# Patient Record
Sex: FEMALE | Race: BLACK OR AFRICAN AMERICAN | Employment: STUDENT | URBAN - METROPOLITAN AREA
[De-identification: names, ages, dates, MRNs, and addresses within clinical notes are randomized per-mention and may not be internally consistent; named-entity substitution may affect disease eponyms.]

---

## 2017-01-16 ENCOUNTER — OFFICE VISIT (OUTPATIENT)
Dept: FAMILY MEDICINE CLINIC | Age: 27
End: 2017-01-16

## 2017-01-16 VITALS
WEIGHT: 103.4 LBS | OXYGEN SATURATION: 98 % | HEART RATE: 74 BPM | RESPIRATION RATE: 16 BRPM | BODY MASS INDEX: 17.65 KG/M2 | HEIGHT: 64 IN | TEMPERATURE: 98.2 F | SYSTOLIC BLOOD PRESSURE: 113 MMHG | DIASTOLIC BLOOD PRESSURE: 75 MMHG

## 2017-01-16 DIAGNOSIS — Z11.3 SCREENING FOR STD (SEXUALLY TRANSMITTED DISEASE): ICD-10-CM

## 2017-01-16 DIAGNOSIS — N60.01 BREAST CYST, RIGHT: Primary | ICD-10-CM

## 2017-01-16 DIAGNOSIS — Z77.21 EXPOSURE TO BLOOD OR BODY FLUID: ICD-10-CM

## 2017-01-16 NOTE — PROGRESS NOTES
1. Have you been to the ER, urgent care clinic since your last visit? Hospitalized since your last visit? No    2. Have you seen or consulted any other health care providers outside of the [address] [place] [address] since your last visit? Include any pap smears or colon screening.  No     Chief Complaint   Patient presents with    Follow-up     1 month     Breast Cyst

## 2017-01-16 NOTE — PATIENT INSTRUCTIONS
Fibrocystic Breast Changes: Care Instructions  Your Care Instructions  Fibrocystic breast changes cause many small lumps to form in your breast. Some areas of your breast may feel thicker or denser than other areas. Your breasts also may feel sore or tender. You may notice lumps in both breasts around the nipple and in the upper, outer part of the breasts, especially before your menstrual period. The lumps may come and go and change size in just a few days. Fibrocystic breast changes are normal and are not cancer. Treatment is not usually needed. If you have a hard, grainy lump, unusual pain, or nipple discharge, your doctor may order tests to look for a more serious problem. Talk to your doctor about the need for regular mammograms. Follow-up care is a key part of your treatment and safety. Be sure to make and go to all appointments, and call your doctor if you are having problems. Its also a good idea to know your test results and keep a list of the medicines you take. How can you care for yourself at home? · Take an over-the-counter pain medicine, such as acetaminophen (Tylenol), ibuprofen (Advil, Motrin), or naproxen (Aleve). Read and follow all instructions on the label. · Do not take two or more pain medicines at the same time unless the doctor told you to. Many pain medicines have acetaminophen, which is Tylenol. Too much acetaminophen (Tylenol) can be harmful. · Wear a supportive bra, such as a sports bra or jog bra. · You may want to limit caffeine. Some women say that cutting back on caffeine reduces breast tenderness. · A diet very low in fat (about 15% of daily diet) may reduce breast tenderness. Talk to your doctor about whether you should try a very low-fat diet. When should you call for help? Watch closely for changes in your health, and be sure to contact your doctor if:  · You have a fever. · You have swelling, redness, or pain.   · You have discharge from your nipple that looks like pus or blood. · You have a new, hard lump in your breast.  Where can you learn more? Go to http://patrice-florencio.info/. Enter D644 in the search box to learn more about \"Fibrocystic Breast Changes: Care Instructions. \"  Current as of: February 25, 2016  Content Version: 11.1  © 4070-1788 [employer]. Care instructions adapted under license by [employer] (which disclaims liability or warranty for this information). If you have questions about a medical condition or this instruction, always ask your healthcare professional. [place] [address] any warranty or liability for your use of this information. Breast Lumps (Noncancerous): Care Instructions  Your Care Instructions  Breast lumps or changes are a common health worry for most women. Women may have many kinds of breast lumps and other breast changes throughout their lives, including ones that occur with menstrual periods, pregnancy, and aging. Most breast lumps are harmless and are not cancer. Many womens breasts feel lumpy and tender before their menstrual periods. Women also may have lumps when they are breastfeeding. Breast lumps may go away after menopause. Common noncancerous breast lumps include:  · Cysts, or sacs filled with fluid. · Skin cysts in the breast area. · Fatty lumps, which may be firm. These may or may not cause pain. · Fibroadenomas, growths that are round and firm with smooth edges. · Abscesses, which are pockets of infection within the breast.  Follow-up care is a key part of your treatment and safety. Be sure to make and go to all appointments, and call your doctor if you are having problems. It's also a good idea to know your test results and keep a list of the medicines you take. How can you care for yourself at home? · Take an over-the-counter pain medicine, such as acetaminophen (Tylenol), ibuprofen (Advil, Motrin), or naproxen (Aleve).  Read and follow all instructions on the label. · Do not take two or more pain medicines at the same time unless the doctor told you to. Many pain medicines have acetaminophen, which is Tylenol. Too much acetaminophen (Tylenol) can be harmful. · If you are pregnant, do not take any medicine other than acetaminophen unless your doctor has told you to. · Wear a supportive bra, such as a sports bra or jog bra. · You may want to limit caffeine. Some women say that cutting back on caffeine reduces their breast tenderness. · A diet very low in fat (about 15% of daily diet) may reduce breast tenderness. Talk to your doctor about whether you should try a very low-fat diet. · A diet low in salt (sodium) also may reduce breast tenderness. When should you call for help? Watch closely for changes in your health, and be sure to contact your doctor if you:  · Have a fever. · Have swelling, redness, or pain. · Have a new breast lump that does not go away with your menstrual cycle. · Notice that a breast lump has changed. Where can you learn more? Go to http://patrice-florencio.info/. Enter [id number] in the search box to learn more about \"Breast Lumps (Noncancerous): Care Instructions. \"  Current as of: February 25, 2016  Content Version: 11.1  © 3788-2513 [employer]. Care instructions adapted under license by [employer] (which disclaims liability or warranty for this information). If you have questions about a medical condition or this instruction, always ask your healthcare professional. [place] [address] any warranty or liability for your use of this information. Exposure to Sexually Transmitted Infections: Care Instructions  Your Care Instructions  Sexually transmitted infections (STIs) are those diseases spread by sexual contact. There are at least 20 different STIs, including chlamydia, gonorrhea, syphilis, and human immunodeficiency virus (HIV), which causes AIDS. Bacteria-caused STIs can be treated and cured. STIs caused by viruses, such as HIV, can be treated but not cured. Some STIs can reduce a woman's chances of getting pregnant in the future. STIs are spread during sexual contact, such as vaginal intercourse and oral or anal sex. Follow-up care is a key part of your treatment and safety. Be sure to make and go to all appointments, and call your doctor if you are having problems. Its also a good idea to know your test results and keep a list of the medicines you take. How can you care for yourself at home? · Your doctor may have given you a shot of antibiotics. If your doctor prescribed antibiotic pills, take them as directed. Do not stop taking them just because you feel better. You need to take the full course of antibiotics. · Do not have sexual contact while you have symptoms of an STI or are being treated for an STI. · Tell your sex partner (or partners) that he or she will need treatment. · If you are a woman, do not douche. Douching changes the normal balance of bacteria in the vagina and may spread an infection up into your reproductive organs. To prevent exposure to STIs in the future  · Use latex condoms every time you have sex. Use them from the beginning to the end of sexual contact. · Talk to your partner before you have sex. Find out if he or she has or is at risk for any STI. Keep in mind that a person may be able to spread an STI even if he or she does not have symptoms. · Do not have sex if you are being treated for an STI. · Do not have sex with anyone who has symptoms of an STI, such as sores on the genitals or mouth. · Having one sex partner (who does not have STIs and does not have sex with anyone else) is a good way to avoid STIs. When should you call for help? Call your doctor now or seek immediate medical care if:  · You have new pain in your belly or pelvis. · You have symptoms of a urinary tract infection.  These may include:  ¨ Pain or burning when you urinate. ¨ A frequent need to urinate without being able to pass much urine. ¨ Pain in the flank, which is just below the rib cage and above the waist on either side of the back. ¨ Blood in your urine. ¨ A fever. · You have new or worsening pain or swelling in the scrotum. Watch closely for changes in your health, and be sure to contact your doctor if:  · You have unusual vaginal bleeding. · You have a discharge from the vagina or penis. · You have any new symptoms, such as sores, bumps, rashes, blisters, or warts. · You have itching, tingling, pain, or burning in the genital or anal area. · You think you may have an STI. Where can you learn more? Go to http://patrice-florencio.info/. Enter O307 in the search box to learn more about \"Exposure to Sexually Transmitted Infections: Care Instructions. \"  Current as of: May 27, 2016  Content Version: 11.1  © 1763-5364 [employer]. Care instructions adapted under license by [employer] (which disclaims liability or warranty for this information). If you have questions about a medical condition or this instruction, always ask your healthcare professional. [place] [address] any warranty or liability for your use of this information. Gonorrhea and Chlamydia: About These Tests  What is it? You can have a test to find out if you have gonorrhea or chlamydia. This kind of test checks for the bacteria that cause these sexually transmitted infections (STIs). There are different ways to test for the bacteria. Most tests use either a urine sample or a sample of body fluid. A fluid sample can come from inside the tip of the penis or from inside the rectum or vagina. Why is this test done? These tests may be done to:  · Find out if your symptoms are caused by gonorrhea or chlamydia. · Find out if you have one of these infections even though you don't have symptoms.   How can you prepare for the test?  · If you are a woman, do not douche or use vaginal creams or medicines for at least 24 hours before the test.  · If you are going to have a urine test, do not urinate for 2 hours before the test.  What happens during the test?  · To get a sample from the urethra or rectum, the doctor will put a small swab into the tip of the penis or inside the rectum. · To get a sample from the vagina, the doctor will first put a speculum into the vagina. A speculum is a tool to spread apart the walls of the vagina. Then he or she will use a small swab to get the fluid sample. · For a urine sample, you will collect the urine that comes out when you first start to urinate. Don't wipe the genital area clean before you urinate. What else should you know about the test?  · If you think you may have chlamydia or gonorrhea, don't have sexual intercourse until you get your test results. And you may want to have tests for other STIs, such as HIV. · If you do have an infection, don't have sexual intercourse for 7 days after you start treatment. · If you have an infection, your sex partner(s) should also be treated. How long does the test take? · The test will take a few minutes. What happens after the test?  · You will be able to go home right away. · You can go back to your usual activities right away. Follow-up care is a key part of your treatment and safety. Be sure to make and go to all appointments, and call your doctor if you are having problems. It's also a good idea to keep a list of the medicines you take. Ask your doctor when you can expect to have your test results. Where can you learn more? Go to http://patrice-florencio.info/. Enter D751 in the search box to learn more about \"Gonorrhea and Chlamydia: About These Tests. \"  Current as of: May 27, 2016  Content Version: 11.1  © 1950-7375 [employer].  Care instructions adapted under license by [employer] (which disclaims liability or warranty for this information). If you have questions about a medical condition or this instruction, always ask your healthcare professional. [address] any warranty or liability for your use of this information.

## 2017-01-16 NOTE — PROGRESS NOTES
Chief Complaint   Patient presents with    Follow-up     1 month     Breast Cyst     she is a 32y.o. year old female who presents for evalution. The cyst went down. She does not drink caffeine but eats a lot of cookies. She has decided to cut down on that too. Reviewed PmHx, RxHx, FmHx, SocHx, AllgHx and updated and dated in the chart. Patient Active Problem List    Diagnosis    Chronic migraine without aura, with intractable migraine, so stated, without mention of status migrainosus       Nurse notes were reviewed and copied and are correct  Review of Systems - negative except as listed above in the HPI    Objective:     Vitals:    01/16/17 1037   BP: 113/75   Pulse: 74   Resp: 16   Temp: 98.2 °F (36.8 °C)   TempSrc: Oral   SpO2: 98%   Weight: 103 lb 6.4 oz (46.9 kg)   Height: 5' 4\" (1.626 m)       Physical Examination: General appearance - alert, well appearing, and in no distress  Mental status - alert, oriented to person, place, and time      Assessment/ Plan:   Billie was seen today for follow-up and breast cyst.    Diagnoses and all orders for this visit:    Breast cyst, right     the sugar/carbs might be associated with the cysts. With the sig fam history of breast CA I will recheck the mammo in aother year  Follow-up Disposition: Not on File    ICD-10-CM ICD-9-CM    1. Breast cyst, right N60.01 610.0        I have discussed the diagnosis with the patient and the intended plan as seen in the above orders. The patient has received an after-visit summary and questions were answered concerning future plans. Medication Side Effects and Warnings were discussed with patient: yes  Patient Labs were reviewed and or requested: yes    Patient Past Records were reviewed and or requested: yes        There are no Patient Instructions on file for this visit.     The patient verbalizes understanding and agrees with the plan of care        Patient has the advanced directives booklet to review

## 2017-01-16 NOTE — MR AVS SNAPSHOT
Visit Information Date & Time Provider Department Dept. Phone Encounter #  
 1/16/2017 10:00 AM Chikis White MD [facility] [id number] Follow-up Instructions Return in about 1 year (around 1/16/2018). Upcoming Health Maintenance Date Due  
 HPV AGE 9Y-34Y (1 of 3 - Female 3 Dose Series) 3/1/2001 DTaP/Tdap/Td series (1 - Tdap) 3/1/2011 PAP AKA CERVICAL CYTOLOGY 3/1/2011 Allergies as of 1/16/2017  Review Complete On: 1/16/2017 By: Chikis White MD  
  
 Severity Noted Reaction Type Reactions Cinnamon High 10/25/2016    Anaphylaxis Dilaudid [Hydromorphone] High 12/22/2015    Hives, Swelling Zofran (As Hydrochloride) [Ondansetron Hcl] High 12/22/2015    Hives, Swelling Sulfa (Sulfonamide Antibiotics) Medium 12/22/2015    Hives, Nausea and Vomiting Dilaudid [Hydromorphone (Bulk)]  02/06/2014    Hives Imitrex [Sumatriptan Succinate]  04/18/2014    Nausea Only Mushroom  10/25/2016    Vertigo Red Dye  01/16/2017    Itching, Swelling In eyes Sulfa (Sulfonamide Antibiotics)  02/06/2014    Hives Zofran [Ondansetron Hcl (Pf)]  02/06/2014    Hives Current Immunizations  Reviewed on 10/3/2016 Name Date Influenza Vaccine (Quad) PF 10/3/2016 Not reviewed this visit You Were Diagnosed With   
  
 Codes Comments Breast cyst, right    -  Primary ICD-10-CM: N60.01 
ICD-9-CM: 610.0 Exposure to blood or body fluid     ICD-10-CM: Z77.21 
ICD-9-CM: V15.85 Screening for STD (sexually transmitted disease)     ICD-10-CM: Z11.3 ICD-9-CM: V74.5 Vitals BP Pulse Temp Resp Height(growth percentile) Weight(growth percentile) 113/75 74 98.2 °F (36.8 °C) (Oral) 16 5' 4\" (1.626 m) 103 lb 6.4 oz (46.9 kg) LMP SpO2 BMI OB Status Smoking Status 12/27/2016 98% 17.75 kg/m2 Having regular periods Never Smoker Vitals History BMI and BSA Data Body Mass Index Body Surface Area 17.75 kg/m 2 1.46 m 2 Preferred Pharmacy Pharm[facility],  [place] [address] [place] [address] [phone] Your Updated Medication List  
  
   
This list is accurate as of: 1/16/17 11:10 AM.  Always use your most recent med list.  
  
  
  
  
 butalbital-acetaminophen-caffeine -40 mg per tablet Commonly known as:  FIORICET, ESGIC  
1-2 tabs at onset of migraine. Can repeat 1 tab in 4 hours if headache persists. Limit: 3 tabs/ day, max 3 days a week EPINEPHrine 0.3 mg/0.3 mL injection Commonly known as:  EPIPEN  
0.3 mL by IntraMUSCular route once as needed for up to 1 dose. rizatriptan 10 mg disintegrating tablet Commonly known as:  MAXALT-MLT 1 tab at onset of migraine; repeat 1 tab in 2 hours if HA remains; Limit: 2 tabs in 24 hours, max 3 days/ week. 30 Day Rx  
  
 vitamin E 400 unit capsule Commonly known as:  [address] Take  by mouth daily. We Performed the Following CT/NG/T.VAGINALIS AMPLIFICATION [id number] CPT(R)] HIV 1/2 AG/AB, 4TH GENERATION,W RFLX CONFIRM [[id number] Custom] Follow-up Instructions Return in about 1 year (around 1/16/2018). Patient Instructions Fibrocystic Breast Changes: Care Instructions Your Care Instructions Fibrocystic breast changes cause many small lumps to form in your breast. Some areas of your breast may feel thicker or denser than other areas. Your breasts also may feel sore or tender. You may notice lumps in both breasts around the nipple and in the upper, outer part of the breasts, especially before your menstrual period. The lumps may come and go and change size in just a few days. Fibrocystic breast changes are normal and are not cancer. Treatment is not usually needed.  If you have a hard, grainy lump, unusual pain, or nipple discharge, your doctor may order tests to look for a more serious problem. Talk to your doctor about the need for regular mammograms. Follow-up care is a key part of your treatment and safety. Be sure to make and go to all appointments, and call your doctor if you are having problems. Its also a good idea to know your test results and keep a list of the medicines you take. How can you care for yourself at home? · Take an over-the-counter pain medicine, such as acetaminophen (Tylenol), ibuprofen (Advil, Motrin), or naproxen (Aleve). Read and follow all instructions on the label. · Do not take two or more pain medicines at the same time unless the doctor told you to. Many pain medicines have acetaminophen, which is Tylenol. Too much acetaminophen (Tylenol) can be harmful. · Wear a supportive bra, such as a sports bra or jog bra. · You may want to limit caffeine. Some women say that cutting back on caffeine reduces breast tenderness. · A diet very low in fat (about 15% of daily diet) may reduce breast tenderness. Talk to your doctor about whether you should try a very low-fat diet. When should you call for help? Watch closely for changes in your health, and be sure to contact your doctor if: 
· You have a fever. · You have swelling, redness, or pain. · You have discharge from your nipple that looks like pus or blood. · You have a new, hard lump in your breast. 
Where can you learn more? Go to http://patrice-florencio.info/. Enter W207 in the search box to learn more about \"Fibrocystic Breast Changes: Care Instructions. \" Current as of: February 25, 2016 Content Version: 11.1 © 9252-8517 [employer]. Care instructions adapted under license by Voicendo (which disclaims liability or warranty for this information).  If you have questions about a medical condition or this instruction, always ask your healthcare professional. [employer] disclaims any warranty or liability for your use of this information. Breast Lumps (Noncancerous): Care Instructions Your Care Instructions Breast lumps or changes are a common health worry for most women. Women may have many kinds of breast lumps and other breast changes throughout their lives, including ones that occur with menstrual periods, pregnancy, and aging. Most breast lumps are harmless and are not cancer. Many womens breasts feel lumpy and tender before their menstrual periods. Women also may have lumps when they are breastfeeding. Breast lumps may go away after menopause. Common noncancerous breast lumps include: · Cysts, or sacs filled with fluid. · Skin cysts in the breast area. · Fatty lumps, which may be firm. These may or may not cause pain. · Fibroadenomas, growths that are round and firm with smooth edges. · Abscesses, which are pockets of infection within the breast. 
Follow-up care is a key part of your treatment and safety. Be sure to make and go to all appointments, and call your doctor if you are having problems. It's also a good idea to know your test results and keep a list of the medicines you take. How can you care for yourself at home? · Take an over-the-counter pain medicine, such as acetaminophen (Tylenol), ibuprofen (Advil, Motrin), or naproxen (Aleve). Read and follow all instructions on the label. · Do not take two or more pain medicines at the same time unless the doctor told you to. Many pain medicines have acetaminophen, which is Tylenol. Too much acetaminophen (Tylenol) can be harmful. · If you are pregnant, do not take any medicine other than acetaminophen unless your doctor has told you to. · Wear a supportive bra, such as a sports bra or jog bra. · You may want to limit caffeine. Some women say that cutting back on caffeine reduces their breast tenderness.  
· A diet very low in fat (about 15% of daily diet) may reduce breast tenderness. Talk to your doctor about whether you should try a very low-fat diet. · A diet low in salt (sodium) also may reduce breast tenderness. When should you call for help? Watch closely for changes in your health, and be sure to contact your doctor if you: 
· Have a fever. · Have swelling, redness, or pain. · Have a new breast lump that does not go away with your menstrual cycle. · Notice that a breast lump has changed. Where can you learn more? Go to http://patrice-florencio.info/. Enter 95 199969 in the search box to learn more about \"Breast Lumps (Noncancerous): Care Instructions. \" Current as of: February 25, 2016 Content Version: 11.1 © 9430-6627 [employer]. Care instructions adapted under license by [employer] (which disclaims liability or warranty for this information). If you have questions about a medical condition or this instruction, always ask your healthcare professional. [place] [address] any warranty or liability for your use of this information. Exposure to Sexually Transmitted Infections: Care Instructions Your Care Instructions Sexually transmitted infections (STIs) are those diseases spread by sexual contact. There are at least 20 different STIs, including chlamydia, gonorrhea, syphilis, and human immunodeficiency virus (HIV), which causes AIDS. Bacteria-caused STIs can be treated and cured. STIs caused by viruses, such as HIV, can be treated but not cured. Some STIs can reduce a woman's chances of getting pregnant in the future. STIs are spread during sexual contact, such as vaginal intercourse and oral or anal sex. Follow-up care is a key part of your treatment and safety. Be sure to make and go to all appointments, and call your doctor if you are having problems. Its also a good idea to know your test results and keep a list of the medicines you take. How can you care for yourself at home? · Your doctor may have given you a shot of antibiotics. If your doctor prescribed antibiotic pills, take them as directed. Do not stop taking them just because you feel better. You need to take the full course of antibiotics. · Do not have sexual contact while you have symptoms of an STI or are being treated for an STI. · Tell your sex partner (or partners) that he or she will need treatment. · If you are a woman, do not douche. Douching changes the normal balance of bacteria in the vagina and may spread an infection up into your reproductive organs. To prevent exposure to STIs in the future · Use latex condoms every time you have sex. Use them from the beginning to the end of sexual contact. · Talk to your partner before you have sex. Find out if he or she has or is at risk for any STI. Keep in mind that a person may be able to spread an STI even if he or she does not have symptoms. · Do not have sex if you are being treated for an STI. · Do not have sex with anyone who has symptoms of an STI, such as sores on the genitals or mouth. · Having one sex partner (who does not have STIs and does not have sex with anyone else) is a good way to avoid STIs. When should you call for help? Call your doctor now or seek immediate medical care if: 
· You have new pain in your belly or pelvis. · You have symptoms of a urinary tract infection. These may include: 
¨ Pain or burning when you urinate. ¨ A frequent need to urinate without being able to pass much urine. ¨ Pain in the flank, which is just below the rib cage and above the waist on either side of the back. ¨ Blood in your urine. ¨ A fever. · You have new or worsening pain or swelling in the scrotum. Watch closely for changes in your health, and be sure to contact your doctor if: 
· You have unusual vaginal bleeding. · You have a discharge from the vagina or penis. · You have any new symptoms, such as sores, bumps, rashes, blisters, or warts. · You have itching, tingling, pain, or burning in the genital or anal area. · You think you may have an STI. Where can you learn more? Go to http://patrice-florencio.info/. Enter A664 in the search box to learn more about \"Exposure to Sexually Transmitted Infections: Care Instructions. \" Current as of: May 27, 2016 Content Version: 11.1 © 2729-8414 UniSmart. Care instructions adapted under license by AirWalk Communications (which disclaims liability or warranty for this information). If you have questions about a medical condition or this instruction, always ask your healthcare professional. [place] [address] any warranty or liability for your use of this information. Gonorrhea and Chlamydia: About These Tests What is it? You can have a test to find out if you have gonorrhea or chlamydia. This kind of test checks for the bacteria that cause these sexually transmitted infections (STIs). There are different ways to test for the bacteria. Most tests use either a urine sample or a sample of body fluid. A fluid sample can come from inside the tip of the penis or from inside the rectum or vagina. Why is this test done? These tests may be done to: · Find out if your symptoms are caused by gonorrhea or chlamydia. · Find out if you have one of these infections even though you don't have symptoms. How can you prepare for the test? 
· If you are a woman, do not douche or use vaginal creams or medicines for at least 24 hours before the test. 
· If you are going to have a urine test, do not urinate for 2 hours before the test. 
What happens during the test? 
· To get a sample from the urethra or rectum, the doctor will put a small swab into the tip of the penis or inside the rectum. · To get a sample from the vagina, the doctor will first put a speculum into the vagina.  A speculum is a tool to spread apart the walls of the vagina. Then he or she will use a small swab to get the fluid sample. · For a urine sample, you will collect the urine that comes out when you first start to urinate. Don't wipe the genital area clean before you urinate. What else should you know about the test? 
· If you think you may have chlamydia or gonorrhea, don't have sexual intercourse until you get your test results. And you may want to have tests for other STIs, such as HIV. · If you do have an infection, don't have sexual intercourse for 7 days after you start treatment. · If you have an infection, your sex partner(s) should also be treated. How long does the test take? · The test will take a few minutes. What happens after the test? 
· You will be able to go home right away. · You can go back to your usual activities right away. Follow-up care is a key part of your treatment and safety. Be sure to make and go to all appointments, and call your doctor if you are having problems. It's also a good idea to keep a list of the medicines you take. Ask your doctor when you can expect to have your test results. Where can you learn more? Go to http://patrice-florencio.info/. Enter U892 in the search box to learn more about \"Gonorrhea and Chlamydia: About These Tests. \" Current as of: May 27, 2016 Content Version: 11.1 © 9107-2664 [employer] Innovation Technologies[employer] Incorporated. Care instructions adapted under license by Exelonix (which disclaims liability or warranty for this information). If you have questions about a medical condition or this instruction, always ask your healthcare professional. [place] [address] any warranty or liability for your use of this information. Introducing [facility] & HEALTH SERVICES! Dear Luna Palomino: Thank you for requesting a RoleStar account. Our records indicate that you already have an active RoleStar account. You can access your account anytime at [email or web address] Did you know that you can access your hospital and ER discharge instructions at any time in Stroodle? You can also review all of your test results from your hospital stay or ER visit. Additional Information If you have questions, please visit the Frequently Asked Questions section of the Stroodle website at [email or web address] Remember, Stroodle is NOT to be used for urgent needs. For medical emergencies, dial 911. Now available from your iPhone and Android! Please provide this summary of care documentation to your next provider. Your primary care clinician is listed as Mik Doherty. If you have any questions after today's visit, please call [phone].

## 2017-01-17 LAB
C TRACH RRNA SPEC QL NAA+PROBE: NEGATIVE
HIV 1+2 AB+HIV1 P24 AG SERPL QL IA: NON REACTIVE
N GONORRHOEA RRNA SPEC QL NAA+PROBE: NEGATIVE
T VAGINALIS RRNA SPEC QL NAA+PROBE: NEGATIVE

## 2017-02-07 ENCOUNTER — TELEPHONE (OUTPATIENT)
Dept: FAMILY MEDICINE CLINIC | Age: 27
End: 2017-02-07

## 2017-02-07 NOTE — TELEPHONE ENCOUNTER
Spoke with pt about the Derm ref and she informed me that she did not need it anymore because of insurance issues and she was going to just pay out of pocket to see her old Dr. pt verbalize understanding of the information.

## 2017-02-07 NOTE — TELEPHONE ENCOUNTER
PT needs a referral for a dermatologist to [address] [place] [address] in [place]. [address] [phone]. Fax # [phone].  Please call the PT when this is done

## 2017-04-18 ENCOUNTER — OFFICE VISIT (OUTPATIENT)
Dept: FAMILY MEDICINE CLINIC | Age: 27
End: 2017-04-18

## 2017-04-18 VITALS
TEMPERATURE: 98.4 F | WEIGHT: 106.8 LBS | RESPIRATION RATE: 16 BRPM | BODY MASS INDEX: 18.23 KG/M2 | OXYGEN SATURATION: 97 % | DIASTOLIC BLOOD PRESSURE: 66 MMHG | HEART RATE: 70 BPM | HEIGHT: 64 IN | SYSTOLIC BLOOD PRESSURE: 104 MMHG

## 2017-04-18 DIAGNOSIS — Z91.018 FOOD ALLERGY: ICD-10-CM

## 2017-04-18 DIAGNOSIS — L70.0 ACNE VULGARIS: ICD-10-CM

## 2017-04-18 DIAGNOSIS — N92.0 MENORRHAGIA WITH REGULAR CYCLE: Primary | ICD-10-CM

## 2017-04-18 RX ORDER — ADAPALENE AND BENZOYL PEROXIDE 3; 25 MG/G; MG/G
GEL TOPICAL
COMMUNITY
Start: 2017-02-23 | End: 2019-07-30

## 2017-04-18 RX ORDER — DOXYCYCLINE HYCLATE 100 MG
TABLET ORAL
COMMUNITY
Start: 2017-03-31 | End: 2019-07-30

## 2017-04-18 RX ORDER — EPINEPHRINE 0.3 MG/.3ML
0.3 INJECTION SUBCUTANEOUS
Qty: 1 SYRINGE | Refills: 3 | Status: SHIPPED | OUTPATIENT
Start: 2017-04-18

## 2017-04-18 NOTE — MR AVS SNAPSHOT
Visit Information Date & Time Provider Department Dept. Phone Encounter #  
 4/18/2017  3:15 PM Ana Diaz MD [facility] [address] [phone] [id number] Follow-up Instructions Return if symptoms worsen or fail to improve. Upcoming Health Maintenance Date Due DTaP/Tdap/Td series (1 - Tdap) 3/1/2011 PAP AKA CERVICAL CYTOLOGY 3/1/2011 Allergies as of 4/18/2017  Review Complete On: 4/18/2017 By: Ana Diaz MD  
  
 Severity Noted Reaction Type Reactions Cinnamon High 10/25/2016    Anaphylaxis Dilaudid [Hydromorphone] High 12/22/2015    Hives, Swelling Zofran (As Hydrochloride) [Ondansetron Hcl] High 12/22/2015    Hives, Swelling Sulfa (Sulfonamide Antibiotics) Medium 12/22/2015    Hives, Nausea and Vomiting Dilaudid [Hydromorphone (Bulk)]  02/06/2014    Hives Imitrex [Sumatriptan Succinate]  04/18/2014    Nausea Only Mushroom  10/25/2016    Vertigo Red Dye  01/16/2017    Itching, Swelling In eyes Sulfa (Sulfonamide Antibiotics)  02/06/2014    Hives Zofran [Ondansetron Hcl (Pf)]  02/06/2014    Hives Current Immunizations  Reviewed on 10/3/2016 Name Date Influenza Vaccine (Quad) PF 10/3/2016 Not reviewed this visit You Were Diagnosed With   
  
 Codes Comments Menorrhagia with regular cycle    -  Primary ICD-10-CM: N92.0 ICD-9-CM: 626.2 Acne vulgaris     ICD-10-CM: L70.0 ICD-9-CM: 706.1 Vitals BP Pulse Temp Resp Height(growth percentile) Weight(growth percentile) 104/66 (BP 1 Location: Left arm, BP Patient Position: Sitting) 70 98.4 °F (36.9 °C) (Oral) 16 5' 4\" (1.626 m) 106 lb 12.8 oz (48.4 kg) LMP SpO2 BMI OB Status Smoking Status 04/04/2017 97% 18.33 kg/m2 Having regular periods Never Smoker Vitals History BMI and BSA Data Body Mass Index Body Surface Area  
 18.33 kg/m 2 1.48 m 2 Preferred Pharmacy Pharmacy Name Phone [facility] [address] [place] [address] [phone] Your Updated Medication List  
  
   
This list is accurate as of: 4/18/17  3:54 PM.  Always use your most recent med list.  
  
  
  
  
 butalbital-acetaminophen-caffeine -40 mg per tablet Commonly known as:  FIORICET, ESGIC  
1-2 tabs at onset of migraine. Can repeat 1 tab in 4 hours if headache persists. Limit: 3 tabs/ day, max 3 days a week  
  
 doxycycline 100 mg tablet Commonly known as:  VIBRA-TABS  
  
 EPIDUO FORTE 0.3-2.5 % Glwp Generic drug:  adapalene-benzoyl peroxide EPINEPHrine 0.3 mg/0.3 mL injection Commonly known as:  EPIPEN  
0.3 mL by IntraMUSCular route once as needed for up to 1 dose. norgestrel-ethinyl estradiol 0.3-30 mg-mcg Tab Commonly known as:  LO/OVRAL Take 1 Tab by mouth daily. rizatriptan 10 mg disintegrating tablet Commonly known as:  MAXALT-MLT 1 tab at onset of migraine; repeat 1 tab in 2 hours if HA remains; Limit: 2 tabs in 24 hours, max 3 days/ week. 30 Day Rx  
  
 vitamin E 400 unit capsule Commonly known as:  [address] Take  by mouth daily. Prescriptions Sent to Pharmacy Refills  
 norgestrel-ethinyl estradiol (LO/OVRAL) 0.3-30 mg-mcg tab 11 Sig: Take 1 Tab by mouth daily. Class: Normal  
 Pharmacy: [place] [address], [address] Ph #: [phone] Route: Oral  
  
Follow-up Instructions Return if symptoms worsen or fail to improve. Introducing [facility] & HEALTH SERVICES! Dear Hinda Galeazzi: Thank you for requesting a Fullbridge account. Our records indicate that you already have an active Fullbridge account. You can access your account anytime at [email or web address] Did you know that you can access your hospital and ER discharge instructions at any time in Fullbridge? You can also review all of your test results from your hospital stay or ER visit. Additional Information If you have questions, please visit the Frequently Asked Questions section of the [employer] website at [email or web address] Remember, OwnerListens is NOT to be used for urgent needs. For medical emergencies, dial 911. Now available from your iPhone and Android! Please provide this summary of care documentation to your next provider. Your primary care clinician is listed as Kimberly Carvajal. If you have any questions after today's visit, please call [phone].

## 2017-04-18 NOTE — PROGRESS NOTES
Chief Complaint   Patient presents with    Medication Evaluation     Birth control pills    Medication Refill     Epipen

## 2017-04-19 NOTE — PROGRESS NOTES
Chief Complaint   Patient presents with    Medication Evaluation     Birth control pills    Medication Refill     Epipen     she is a 32y.o. year old female who presents for evalution. Reviewed PmHx, RxHx, FmHx, SocHx, AllgHx and updated and dated in the chart. Patient Active Problem List    Diagnosis    Chronic migraine without aura, with intractable migraine, so stated, without mention of status migrainosus       Nurse notes were reviewed and copied and are correct  Review of Systems - negative except as listed above in the HPI    Objective:     Vitals:    04/18/17 1531   BP: 104/66   Pulse: 70   Resp: 16   Temp: 98.4 °F (36.9 °C)   TempSrc: Oral   SpO2: 97%   Weight: 106 lb 12.8 oz (48.4 kg)   Height: 5' 4\" (1.626 m)     Physical Examination: General appearance - alert, well appearing, and in no distress  Mental status - alert, oriented to person, place, and time  Skin - LESIONS NOTED: acne lesions noted         Assessment/ Plan:   Billie was seen today for medication evaluation and medication refill. Diagnoses and all orders for this visit:    Menorrhagia with regular cycle  -     norgestrel-ethinyl estradiol (LO/OVRAL) 0.3-30 mg-mcg tab; Take 1 Tab by mouth daily. Acne vulgaris  -     norgestrel-ethinyl estradiol (LO/OVRAL) 0.3-30 mg-mcg tab; Take 1 Tab by mouth daily. Also referred to an [facility] allergy  -     EPINEPHrine (EPIPEN) 0.3 mg/0.3 mL injection; 0.3 mL by IntraMUSCular route once as needed for up to 1 dose. Follow-up Disposition:  Return if symptoms worsen or fail to improve. ICD-10-CM ICD-9-CM    1. Menorrhagia with regular cycle N92.0 626.2 norgestrel-ethinyl estradiol (LO/OVRAL) 0.3-30 mg-mcg tab   2. Acne vulgaris L70.0 706.1 norgestrel-ethinyl estradiol (LO/OVRAL) 0.3-30 mg-mcg tab   3.  Food allergy Z91.018 V15.05 EPINEPHrine (EPIPEN) 0.3 mg/0.3 mL injection       I have discussed the diagnosis with the patient and the intended plan as seen in the above orders. The patient has received an after-visit summary and questions were answered concerning future plans. Medication Side Effects and Warnings were discussed with patient: yes  Patient Labs were reviewed and or requested: yes  Patient Past Records were reviewed and or requested: yes        There are no Patient Instructions on file for this visit.     The patient verbalizes understanding and agrees with the plan of care        Patient has the advanced directives booklet to review

## 2017-10-09 ENCOUNTER — OFFICE VISIT (OUTPATIENT)
Dept: FAMILY MEDICINE CLINIC | Age: 27
End: 2017-10-09

## 2017-10-09 VITALS
WEIGHT: 108 LBS | RESPIRATION RATE: 16 BRPM | HEART RATE: 66 BPM | DIASTOLIC BLOOD PRESSURE: 78 MMHG | OXYGEN SATURATION: 98 % | HEIGHT: 64 IN | BODY MASS INDEX: 18.44 KG/M2 | SYSTOLIC BLOOD PRESSURE: 118 MMHG | TEMPERATURE: 98.3 F

## 2017-10-09 DIAGNOSIS — L70.0 ACNE VULGARIS: Primary | ICD-10-CM

## 2017-10-09 DIAGNOSIS — N92.0 MENORRHAGIA WITH REGULAR CYCLE: ICD-10-CM

## 2017-10-09 DIAGNOSIS — Z23 ENCOUNTER FOR IMMUNIZATION: ICD-10-CM

## 2017-10-09 NOTE — PROGRESS NOTES
Chief Complaint   Patient presents with    Follow-up     1 year follow-up on birth control     she is a 32y.o. year old female who presents for evalution. Periods better now 3-4 days. No negative side effects now. The first week she was a little nauseated. That only happed the first week of the pills each month now there is no nauea at all. Reviewed PmHx, RxHx, FmHx, SocHx, AllgHx and updated and dated in the chart. Aspirin yes ____   No____ N/A____    Patient Active Problem List    Diagnosis    Chronic migraine without aura, with intractable migraine, so stated, without mention of status migrainosus       Nurse notes were reviewed and copied and are correct  Review of Systems - negative except as listed above in the HPI    Objective:     Vitals:    10/09/17 1418   BP: 118/78   Pulse: 66   Resp: 16   Temp: 98.3 °F (36.8 °C)   TempSrc: Oral   SpO2: 98%   Weight: 108 lb (49 kg)   Height: 5' 4\" (1.626 m)       Physical Examination: General appearance - alert, well appearing, and in no distress  Mental status - alert, oriented to person, place, and time  Chest - clear to auscultation, no wheezes, rales or rhonchi, symmetric air entry  Heart - normal rate, regular rhythm, normal S1, S2, no murmurs, rubs, clicks or gallops  Skin - red acne papules on both cheeks      Assessment/ Plan:   Diagnoses and all orders for this visit:    1. Acne vulgaris  -     metroNIDAZOLE (NORITATE) 1 % topical cream; Apply  to affected area daily. Use a thin layer to affected areas after washing  -     norgestrel-ethinyl estradiol (LO/OVRAL) 0.3-30 mg-mcg tab; Take 1 Tab by mouth daily. 2. Menorrhagia with regular cycle  -     norgestrel-ethinyl estradiol (LO/OVRAL) 0.3-30 mg-mcg tab; Take 1 Tab by mouth daily. 3. Encounter for immunization  -     INFLUENZA VIRUS VACCINE QUADRIVALENT, PRESERVATIVE FREE SYRINGE (63936)       Follow-up Disposition:  Return in about 1 year (around 10/9/2018). ICD-10-CM ICD-9-CM    1.  Acne vulgaris L70.0 706.1 metroNIDAZOLE (NORITATE) 1 % topical cream      norgestrel-ethinyl estradiol (LO/OVRAL) 0.3-30 mg-mcg tab   2. Menorrhagia with regular cycle N92.0 626.2 norgestrel-ethinyl estradiol (LO/OVRAL) 0.3-30 mg-mcg tab   3. Encounter for immunization Z23 V03.89 INFLUENZA VIRUS VAC QUAD,SPLIT,PRESV FREE SYRINGE IM       I have discussed the diagnosis with the patient and the intended plan as seen in the above orders. The patient has received an after-visit summary and questions were answered concerning future plans. Medication Side Effects and Warnings were discussed with patient: yes  Patient Labs were reviewed and or requested: yes  Patient Past Records were reviewed and or requested: yes        There are no Patient Instructions on file for this visit.     The patient verbalizes understanding and agrees with the plan of care        Patient has the advanced directives booklet to review

## 2017-10-09 NOTE — PROGRESS NOTES
1. Have you been to the ER, urgent care clinic since your last visit? Hospitalized since your last visit[facility]. 2. Have you seen or consulted any other health care providers outside of the [address] [place] [address] since your last visit? Include any pap smears or colon screening.  No    Chief Complaint   Patient presents with    Follow-up     1 year follow-up on birth control

## 2017-10-10 DIAGNOSIS — L70.0 ACNE VULGARIS: Primary | ICD-10-CM

## 2017-10-10 RX ORDER — METRONIDAZOLE 7.5 MG/G
GEL TOPICAL
Qty: 60 G | Refills: 0 | Status: SHIPPED | OUTPATIENT
Start: 2017-10-10 | End: 2019-07-30

## 2018-03-26 ENCOUNTER — OFFICE VISIT (OUTPATIENT)
Dept: FAMILY MEDICINE CLINIC | Age: 28
End: 2018-03-26

## 2018-03-26 VITALS
OXYGEN SATURATION: 98 % | HEIGHT: 64 IN | RESPIRATION RATE: 16 BRPM | HEART RATE: 64 BPM | DIASTOLIC BLOOD PRESSURE: 80 MMHG | WEIGHT: 109 LBS | TEMPERATURE: 98.5 F | SYSTOLIC BLOOD PRESSURE: 118 MMHG | BODY MASS INDEX: 18.61 KG/M2

## 2018-03-26 DIAGNOSIS — R40.0 DAYTIME SOMNOLENCE: ICD-10-CM

## 2018-03-26 DIAGNOSIS — R53.83 OTHER FATIGUE: Primary | ICD-10-CM

## 2018-03-26 NOTE — MR AVS SNAPSHOT
[address] [place] [facility] [address] 
[phone] Patient: Christopher Gandara MRN: [id number]:[id number] Visit Information Date & Time Provider Department Dept. Phone Encounter #  
 3/26/2018  8:15 AM Irene Herrera MD [address] [place] [address] [id number] Follow-up Instructions Return if symptoms worsen or fail to improve, for follow up after sleep study. Upcoming Health Maintenance Date Due DTaP/Tdap/Td series (1 - Tdap) 3/1/2011 PAP AKA CERVICAL CYTOLOGY 3/1/2011 Allergies as of 3/26/2018  Review Complete On: 3/26/2018 By: Irene Herrera MD  
  
 Severity Noted Reaction Type Reactions Cinnamon High 10/25/2016    Anaphylaxis Dilaudid [Hydromorphone] High 12/22/2015    Hives, Swelling Zofran (As Hydrochloride) [Ondansetron Hcl] High 12/22/2015    Hives, Swelling Sulfa (Sulfonamide Antibiotics) Medium 12/22/2015    Hives, Nausea and Vomiting Dilaudid [Hydromorphone (Bulk)]  02/06/2014    Hives Imitrex [Sumatriptan Succinate]  04/18/2014    Nausea Only Mushroom  10/25/2016    Vertigo Red Dye  01/16/2017    Itching, Swelling In eyes Sulfa (Sulfonamide Antibiotics)  02/06/2014    Hives Zofran [Ondansetron Hcl (Pf)]  02/06/2014    Hives Current Immunizations  Reviewed on 10/3/2016 Name Date Influenza Vaccine (Quad) PF 10/9/2017, 10/3/2016 Not reviewed this visit You Were Diagnosed With   
  
 Codes Comments Other fatigue    -  Primary ICD-10-CM: R53.83 ICD-9-CM: 780.79 Daytime somnolence     ICD-10-CM: R40.0 ICD-9-CM: 780.54 Vitals BP Pulse Temp Resp Height(growth percentile) Weight(growth percentile) 118/80 64 98.5 °F (36.9 °C) (Oral) 16 5' 4\" (1.626 m) 109 lb (49.4 kg) LMP SpO2 BMI OB Status Smoking Status 02/28/2018 98% 18.71 kg/m2 Having regular periods Never Smoker Vitals History BMI and BSA Data Body Mass Index Body Surface Area 18.71 kg/m 2 1.49 m 2 Preferred Pharmacy Pharmacy Name Phone [address] [place] [address] [phone] Your Updated Medication List  
  
   
This list is accurate as of 3/26/18  9:19 AM.  Always use your most recent med list.  
  
  
  
  
 butalbital-acetaminophen-caffeine -40 mg per tablet Commonly known as:  FIORICET, ESGIC  
1-2 tabs at onset of migraine. Can repeat 1 tab in 4 hours if headache persists. Limit: 3 tabs/ day, max 3 days a week  
  
 doxycycline 100 mg tablet Commonly known as:  VIBRA-TABS  
  
 EPIDUO FORTE 0.3-2.5 % Glwp Generic drug:  adapalene-benzoyl peroxide EPINEPHrine 0.3 mg/0.3 mL injection Commonly known as:  EPIPEN  
0.3 mL by IntraMUSCular route once as needed for up to 1 dose. metroNIDAZOLE 0.75 % topical gel Commonly known as:  Yisel Mackintosh Apply a thin layer to affected areas after washing once daily  
  
 norgestrel-ethinyl estradiol 0.3-30 mg-mcg Tab Commonly known as:  LO/OVRAL Take 1 Tab by mouth daily. rizatriptan 10 mg disintegrating tablet Commonly known as:  MAXALT-MLT 1 tab at onset of migraine; repeat 1 tab in 2 hours if HA remains; Limit: 2 tabs in 24 hours, max 3 days/ week. 30 Day Rx  
  
 vitamin E 400 unit capsule Commonly known as:  [address] Take  by mouth daily. We Performed the Following CBC WITH AUTOMATED DIFF [95467 CPT(R)] METABOLIC PANEL, COMPREHENSIVE [03104 CPT(R)] SLEEP MEDICINE REFERRAL [LOT365 Custom] Comments:  
 Daytime somnolence, always feeling tired rule out YANCY TSH 3RD GENERATION [47373 CPT(R)] Follow-up Instructions Return if symptoms worsen or fail to improve, for follow up after sleep study. Referral Information Referral ID Referred By Referred To  
  
 [id number] Nik Conway 54, MD Kendra Lora [address], [address] Phone: [phone] Fax: [phone] Visits Status Start Date End Date 1 New Request 3/26/18 3/26/19 If your referral has a status of pending review or denied, additional information will be sent to support the outcome of this decision. Introducing [facility]! Dear Leilani Minaya: Thank you for requesting a Phigital account. Our records indicate that you already have an active Phigital account. You can access your account anytime at [email or web address] Did you know that you can access your hospital and ER discharge instructions at any time in Phigital? You can also review all of your test results from your hospital stay or ER visit. Additional Information If you have questions, please visit the Frequently Asked Questions section of the Phigital website at [email or web address] Remember, Phigital is NOT to be used for urgent needs. For medical emergencies, dial 911. Now available from your iPhone and Android! Please provide this summary of care documentation to your next provider. Your primary care clinician is listed as Lang Givens. If you have any questions after today's visit, please call [phone].

## 2018-03-26 NOTE — PROGRESS NOTES
1. Have you been to the ER, urgent care clinic since your last visit? Hospitalized since your last visit? [facility] for bug bite    2. Have you seen or consulted any other health care providers outside of the [address] [place] [address] since your last visit? Include any pap smears or colon screening.  No      Chief Complaint   Patient presents with    Weight Gain    Sweats     night

## 2018-03-26 NOTE — PROGRESS NOTES
Chief Complaint   Patient presents with    Weight Gain    Sweats     night     she is a 29y.o. year old female who presents for evalution. She has been on ocp for a year. Has gained weight since then  Her body fat has increased. Also c/o feeling tired and has to have a nap  She wakes up a lot at night  She has never been told she snores. She is in [employer] now-recent change    She uses biofeedback to help her relax and that does work  She eats a lot of veggies now , fruit and meats      Reviewed PmHx, RxHx, FmHx, SocHx, AllgHx and updated and dated in the chart.     Aspirin yes ____   No____ N/A____    Patient Active Problem List    Diagnosis    Chronic migraine without aura, with intractable migraine, so stated, without mention of status migrainosus       Nurse notes were reviewed and copied and are correct  Review of Systems - negative except as listed above in the HPI    Objective:     Vitals:    03/26/18 0845   BP: 118/80   Pulse: 64   Resp: 16   Temp: 98.5 °F (36.9 °C)   TempSrc: Oral   SpO2: 98%   Weight: 109 lb (49.4 kg)   Height: 5' 4\" (1.626 m)       Physical Examination: General appearance - alert, well appearing, and in no distress  Mental status - alert, oriented to person, place, and time  Nose - normal and patent, no erythema, discharge or polyps  Mouth - mucous membranes moist, pharynx normal without lesions  Neck - supple, no significant adenopathy  Lymphatics - no palpable lymphadenopathy, no hepatosplenomegaly  Chest - clear to auscultation, no wheezes, rales or rhonchi, symmetric air entry  Heart - normal rate, regular rhythm, normal S1, S2, no murmurs, rubs, clicks or gallops  Abdomen - soft, nontender, nondistended, no masses or organomegaly  Musculoskeletal - no joint tenderness, deformity or swelling  Extremities - peripheral pulses normal, no pedal edema, no clubbing or cyanosis  Skin - normal coloration and turgor, no rashes, no suspicious skin lesions noted      Assessment/ Plan: Diagnoses and all orders for this visit:    1. Other fatigue  -     CBC WITH AUTOMATED DIFF  -     TSH 3RD GENERATION  -     METABOLIC PANEL, COMPREHENSIVE  -     SLEEP MEDICINE REFERRAL    2. Daytime somnolence  -     CBC WITH AUTOMATED DIFF  -     TSH 3RD GENERATION  -     METABOLIC PANEL, COMPREHENSIVE  -     SLEEP MEDICINE REFERRAL       Follow-up Disposition:  Return if symptoms worsen or fail to improve, for follow up after sleep study. ICD-10-CM ICD-9-CM    1. Other fatigue R53.83 780.79 CBC WITH AUTOMATED DIFF      TSH 3RD GENERATION      METABOLIC PANEL, COMPREHENSIVE      SLEEP MEDICINE REFERRAL   2. Daytime somnolence R40.0 780.54 CBC WITH AUTOMATED DIFF      TSH 3RD GENERATION      METABOLIC PANEL, COMPREHENSIVE      SLEEP MEDICINE REFERRAL       I have discussed the diagnosis with the patient and the intended plan as seen in the above orders. The patient has received an after-visit summary and questions were answered concerning future plans. Medication Side Effects and Warnings were discussed with patient: yes  Patient Labs were reviewed and or requested: yes  Patient Past Records were reviewed and or requested: yes        There are no Patient Instructions on file for this visit.     The patient verbalizes understanding and agrees with the plan of care        Patient has the advanced directives booklet to review

## 2018-03-27 LAB
ALBUMIN SERPL-MCNC: 4.5 G/DL (ref 3.5–5.5)
ALBUMIN/GLOB SERPL: 1.5 {RATIO} (ref 1.2–2.2)
ALP SERPL-CCNC: 42 IU/L (ref 39–117)
ALT SERPL-CCNC: 10 IU/L (ref 0–32)
AST SERPL-CCNC: 16 IU/L (ref 0–40)
BASOPHILS # BLD AUTO: 0 X10E3/UL (ref 0–0.2)
BASOPHILS NFR BLD AUTO: 1 %
BILIRUB SERPL-MCNC: <0.2 MG/DL (ref 0–1.2)
BUN SERPL-MCNC: 10 MG/DL (ref 6–20)
BUN/CREAT SERPL: 11 (ref 9–23)
CALCIUM SERPL-MCNC: 9.7 MG/DL (ref 8.7–10.2)
CHLORIDE SERPL-SCNC: 100 MMOL/L (ref 96–106)
CO2 SERPL-SCNC: 22 MMOL/L (ref 18–29)
CREAT SERPL-MCNC: 0.93 MG/DL (ref 0.57–1)
EOSINOPHIL # BLD AUTO: 0.2 X10E3/UL (ref 0–0.4)
EOSINOPHIL NFR BLD AUTO: 4 %
ERYTHROCYTE [DISTWIDTH] IN BLOOD BY AUTOMATED COUNT: 13.7 % (ref 12.3–15.4)
GFR SERPLBLD CREATININE-BSD FMLA CKD-EPI: 84 ML/MIN/1.73
GFR SERPLBLD CREATININE-BSD FMLA CKD-EPI: 97 ML/MIN/1.73
GLOBULIN SER CALC-MCNC: 3.1 G/DL (ref 1.5–4.5)
GLUCOSE SERPL-MCNC: 81 MG/DL (ref 65–99)
HCT VFR BLD AUTO: 44.1 % (ref 34–46.6)
HGB BLD-MCNC: 14.5 G/DL (ref 11.1–15.9)
IMM GRANULOCYTES # BLD: 0 X10E3/UL (ref 0–0.1)
IMM GRANULOCYTES NFR BLD: 0 %
LYMPHOCYTES # BLD AUTO: 2.6 X10E3/UL (ref 0.7–3.1)
LYMPHOCYTES NFR BLD AUTO: 40 %
MCH RBC QN AUTO: 29.2 PG (ref 26.6–33)
MCHC RBC AUTO-ENTMCNC: 32.9 G/DL (ref 31.5–35.7)
MCV RBC AUTO: 89 FL (ref 79–97)
MONOCYTES # BLD AUTO: 0.3 X10E3/UL (ref 0.1–0.9)
MONOCYTES NFR BLD AUTO: 5 %
NEUTROPHILS # BLD AUTO: 3.2 X10E3/UL (ref 1.4–7)
NEUTROPHILS NFR BLD AUTO: 50 %
PLATELET # BLD AUTO: 318 X10E3/UL (ref 150–379)
POTASSIUM SERPL-SCNC: 4.9 MMOL/L (ref 3.5–5.2)
PROT SERPL-MCNC: 7.6 G/DL (ref 6–8.5)
RBC # BLD AUTO: 4.96 X10E6/UL (ref 3.77–5.28)
SODIUM SERPL-SCNC: 141 MMOL/L (ref 134–144)
TSH SERPL DL<=0.005 MIU/L-ACNC: 2.38 UIU/ML (ref 0.45–4.5)
WBC # BLD AUTO: 6.4 X10E3/UL (ref 3.4–10.8)

## 2018-04-16 ENCOUNTER — TELEPHONE (OUTPATIENT)
Dept: FAMILY MEDICINE CLINIC | Age: 28
End: 2018-04-16

## 2018-04-17 DIAGNOSIS — J34.89 NASAL CAVITY MASS: Primary | ICD-10-CM

## 2018-04-17 NOTE — TELEPHONE ENCOUNTER
Spoke with pt and she stated that Dr. Agustin Friday is in network with her insurance. She has obtained an appt for 5/4/218 at 2:15 p.m. Pt was told during dental evaluation that on the x-ray it showed a partical near her nasal cavity and she needs to follow-up with ENT. Referral completed in system.

## 2018-04-17 NOTE — TELEPHONE ENCOUNTER
Spoke with pt and provided with contact information for Dr. Guero Wells and advised to contact their office. Obtain appointment and confirm specialist accepts  insurance then contact our office back with information then referral can be completed. Pt verbalized understanding and no further questions.

## 2018-05-14 DIAGNOSIS — L70.0 ACNE VULGARIS: ICD-10-CM

## 2018-05-14 DIAGNOSIS — N92.0 MENORRHAGIA WITH REGULAR CYCLE: ICD-10-CM

## 2018-06-13 ENCOUNTER — OFFICE VISIT (OUTPATIENT)
Dept: FAMILY MEDICINE CLINIC | Age: 28
End: 2018-06-13

## 2018-06-13 VITALS
HEIGHT: 64 IN | OXYGEN SATURATION: 98 % | DIASTOLIC BLOOD PRESSURE: 70 MMHG | WEIGHT: 111.9 LBS | TEMPERATURE: 98.3 F | RESPIRATION RATE: 18 BRPM | HEART RATE: 79 BPM | BODY MASS INDEX: 19.1 KG/M2 | SYSTOLIC BLOOD PRESSURE: 107 MMHG

## 2018-06-13 DIAGNOSIS — K52.9 ACUTE GASTROENTERITIS: Primary | ICD-10-CM

## 2018-06-13 DIAGNOSIS — R10.84 GENERALIZED ABDOMINAL PAIN: ICD-10-CM

## 2018-06-13 DIAGNOSIS — J02.9 SORE THROAT: ICD-10-CM

## 2018-06-13 LAB
S PYO AG THROAT QL: NEGATIVE
VALID INTERNAL CONTROL?: YES

## 2018-06-13 RX ORDER — PROMETHAZINE HYDROCHLORIDE 12.5 MG/1
12.5 TABLET ORAL
Qty: 20 TAB | Refills: 0 | Status: SHIPPED | OUTPATIENT
Start: 2018-06-13 | End: 2018-06-13 | Stop reason: SDUPTHER

## 2018-06-13 RX ORDER — PROMETHAZINE HYDROCHLORIDE 12.5 MG/1
12.5 TABLET ORAL
Qty: 20 TAB | Refills: 0 | Status: SHIPPED | OUTPATIENT
Start: 2018-06-13 | End: 2019-07-30

## 2018-06-13 NOTE — PROGRESS NOTES
Chief Complaint   Patient presents with    Nausea    Abdominal Pain     X 1 week     Sweats     Pt c/o sharp abdominal pain, nausea, and night sweats starting over a week ago, progressively getting worse. 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

## 2018-06-13 NOTE — PATIENT INSTRUCTIONS
Gastroenteritis: Care Instructions  Your Care Instructions    Gastroenteritis is an illness that may cause nausea, vomiting, and diarrhea. It is sometimes called \"stomach flu. \" It can be caused by bacteria or a virus. You will probably begin to feel better in 1 to 2 days. In the meantime, get plenty of rest and make sure you do not become dehydrated. Dehydration occurs when your body loses too much fluid. Follow-up care is a key part of your treatment and safety. Be sure to make and go to all appointments, and call your doctor if you are having problems. It's also a good idea to know your test results and keep a list of the medicines you take. How can you care for yourself at home? · If your doctor prescribed antibiotics, take them as directed. Do not stop taking them just because you feel better. You need to take the full course of antibiotics. · Drink plenty of fluids to prevent dehydration, enough so that your urine is light yellow or clear like water. Choose water and other caffeine-free clear liquids until you feel better. If you have kidney, heart, or liver disease and have to limit fluids, talk with your doctor before you increase your fluid intake. · Drink fluids slowly, in frequent, small amounts, because drinking too much too fast can cause vomiting. · Begin eating mild foods, such as dry toast, yogurt, applesauce, bananas, and rice. Avoid spicy, hot, or high-fat foods, and do not drink alcohol or caffeine for a day or two. Do not drink milk or eat ice cream until you are feeling better. How to prevent gastroenteritis  · Keep hot foods hot and cold foods cold. · Do not eat meats, dressings, salads, or other foods that have been kept at room temperature for more than 2 hours. · Use a thermometer to check your refrigerator. It should be between 34°F and 40°F.  · Defrost meats in the refrigerator or microwave, not on the kitchen counter. · Keep your hands and your kitchen clean.  Wash your hands, cutting boards, and countertops with hot soapy water frequently. · Cook meat until it is well done. · Do not eat raw eggs or uncooked sauces made with raw eggs. · Do not take chances. If food looks or tastes spoiled, throw it out. When should you call for help? Call 911 anytime you think you may need emergency care. For example, call if:  ? · You vomit blood or what looks like coffee grounds. ? · You passed out (lost consciousness). ? · You pass maroon or very bloody stools. ?Call your doctor now or seek immediate medical care if:  ? · You have severe belly pain. ? · You have signs of needing more fluids. You have sunken eyes, a dry mouth, and pass only a little dark urine. ? · You feel like you are going to faint. ? · You have increased belly pain that does not go away in 1 to 2 days. ? · You have new or increased nausea, or you are vomiting. ? · You have a new or higher fever. ? · Your stools are black and tarlike or have streaks of blood. ? Watch closely for changes in your health, and be sure to contact your doctor if:  ? · You are dizzy or lightheaded. ? · You urinate less than usual, or your urine is dark yellow or brown. ? · You do not feel better with each day that goes by. Where can you learn more? Go to http://patrice-florencio.info/. Enter N142 in the search box to learn more about \"Gastroenteritis: Care Instructions. \"  Current as of: March 3, 2017  Content Version: 11.4  © 0380-8749 [employer]. Care instructions adapted under license by [employer] (which disclaims liability or warranty for this information). If you have questions about a medical condition or this instruction, always ask your healthcare professional. [address] any warranty or liability for your use of this information.

## 2018-06-13 NOTE — LETTER
NOTIFICATION RETURN TO WORK / SCHOOL 
 
6/13/2018 3:09 PM 
 
Ms. Gabino Aabd [facility] [phone] [id number] To Whom It May Concern: 
 
Gabino Abad is currently under the care of Ramona Villela. She will return to work/school on: Friday, Corinna 15, 2018. If there are questions or concerns please have the patient contact our office.  
 
 
 
Sincerely, 
 
 
Delia Ambrosio NP

## 2018-06-20 NOTE — PROGRESS NOTES
Subjective:      Patient : This patient is a 29 y.o. female with chief complaint of nausea, vomiting, and abdominal pain. diarrhea. The symptoms began >24 hours ago and have improved. Reports increased abdominal pain today in the right lower abdomen, worse after having BM. Has been improving over the course of the day. She notes she has had no BM over the past 4 days. The symptoms were rapid  in onset. The patient had vomitting. No recent travel or antibiotics. The patient has not tried OTCs for relief of nausea or diarrhea at home for his symptoms  She rates her symtoms as moderate. Several coworkers has been ill with similar symptoms. No fever or chills. Objective:     ROS:   Feeling well. No dyspnea or chest pain on exertion. No abdominal pain, change in bowel habits, black or bloody stools. No urinary tract symptoms. GYN ROS: normal menses, no abnormal bleeding, pelvic pain or discharge, no breast pain or new or enlarging lumps on self exam. No neurological complaints. OBJECTIVE:   The patient appears well, alert, oriented x 3, in no distress. Visit Vitals    /70    Pulse 79    Temp 98.3 °F (36.8 °C) (Oral)    Resp 18    Ht 5' 4\" (1.626 m)    Wt 111 lb 14.4 oz (50.8 kg)    LMP 05/22/2018 (Exact Date)    SpO2 98%    BMI 19.21 kg/m2     HEENT:ENT normal.  Neck supple. No adenopathy or thyromegaly. LIAN. Chest: Lungs are clear, good air entry, no wheezes, rhonchi or rales. Cardiovascular: S1 and S2 normal, no murmurs, regular rate and rhythm. Abdomen: soft without tenderness, guarding, mass or organomegaly. Extremities: show no edema, normal peripheral pulses. Neurological: is normal, no focal findings. Results for orders placed or performed in visit on 06/13/18   AMB POC RAPID STREP A   Result Value Ref Range    VALID INTERNAL CONTROL POC Yes     Group A Strep Ag Negative Negative       Assessment/Plan:   Diagnoses and all orders for this visit:    1.  Acute gastroenteritis  Add rx  Clear liquids, advance as tolerated  -     promethazine (PHENERGAN) 12.5 mg tablet; Take 1 Tab by mouth every six (6) hours as needed for Nausea. 2. Sore throat  -     AMB POC RAPID STREP A    3. Generalized abdominal pain  Improving  monitor    Follow-up Disposition:  Return if symptoms worsen or fail to improve. I have discussed the diagnosis with the patient and the intended plan as seen in the above orders. The patient has received an after-visit summary and questions were answered concerning future plans. Patient conveyed understanding of the plan at the time of the visit.     Viktor Bejarano NP

## 2018-07-31 ENCOUNTER — OFFICE VISIT (OUTPATIENT)
Dept: SLEEP MEDICINE | Age: 28
End: 2018-07-31

## 2018-07-31 VITALS
BODY MASS INDEX: 19.12 KG/M2 | HEART RATE: 77 BPM | DIASTOLIC BLOOD PRESSURE: 70 MMHG | HEIGHT: 64 IN | OXYGEN SATURATION: 99 % | WEIGHT: 112 LBS | SYSTOLIC BLOOD PRESSURE: 107 MMHG

## 2018-07-31 DIAGNOSIS — G47.00 INSOMNIA, UNSPECIFIED TYPE: Primary | ICD-10-CM

## 2018-07-31 DIAGNOSIS — F41.9 ANXIETY: ICD-10-CM

## 2018-07-31 NOTE — PATIENT INSTRUCTIONS
[address] [place]. [place], [address]  Tel.  [phone]  Fax. 100 [facility] 60  [place], [address]  Tel.  [phone]  Fax. [phone] [address] Valentín Narvaez  Tel.  [phone]  Fax. [phone]       Insomnia: After Your Visit  Your Care Instructions  Insomnia is the inability to sleep well. It is a common problem for most people at some time. Insomnia may make it hard for you to get to sleep, stay asleep, or sleep as long as you need to. This can make you tired and grouchy during the day. It can also make you forgetful, less effective at work, and unhappy. Insomnia can be caused by conditions such as depression or anxiety. Pain can also affect your ability to sleep. When these problems are solved, the insomnia usually clears up. But sometimes bad sleep habits can cause insomnia. If insomnia is affecting your work or your enjoyment of life, you can take steps to improve your sleep. Follow-up care is a key part of your treatment and safety. Be sure to make and go to all appointments, and call your doctor if you are having problems. It's also a good idea to know your test results and keep a list of the medicines you take. How can you care for yourself at home? What to avoid  · Do not have drinks with caffeine, such as coffee or black tea, for 8 hours before bed. · Do not smoke or use other types of tobacco near bedtime. Nicotine is a stimulant and can keep you awake. · Avoid drinking alcohol late in the evening, because it can cause you to wake in the middle of the night. · Do not eat a big meal close to bedtime. If you are hungry, eat a light snack. · Do not drink a lot of water close to bedtime, because the need to urinate may wake you up during the night. · Do not read or watch TV in bed. Use the bed only for sleeping and sexual activity.   What to try  · Go to bed at the same time every night, and wake up at the same time every morning. Do not take naps during the day. · Keep your bedroom quiet, dark, and cool. · Get regular exercise, but not within 3 to 4 hours of your bedtime. .  · Sleep on a comfortable pillow and mattress. · If watching the clock makes you anxious, turn it facing away from you so you cannot see the time. · If you worry when you lie down, start a worry book. Well before bedtime, write down your worries, and then set the book and your concerns aside. · Try meditation or other relaxation techniques before you go to bed. · If you cannot fall asleep, get up and go to another room until you feel sleepy. Do something relaxing. Repeat your bedtime routine before you go to bed again. · Make your house quiet and calm about an hour before bedtime. Turn down the lights, turn off the TV, log off the computer, and turn down the volume on music. This can help you relax after a busy day. When should you call for help? Watch closely for changes in your health, and be sure to contact your doctor if:  · Your efforts to improve your sleep do not work. · Your insomnia gets worse. · You fall asleep during the day. Where can you learn more? Go to [employer]  Enter P513 in the search box to learn more about \"Insomnia: After Your Visit. \"   © 2412-3975 Healthwise, Incorporated. Care instructions adapted under license by Dona Palumbo (which disclaims liability or warranty for this information). This care instruction is for use with your licensed healthcare professional. If you have questions about a medical condition or this instruction, always ask your healthcare professional. [place] [address] any warranty or liability for your use of this information. Content Version: 9.0.06109; Last Revised: June 26, 2010    Drowsy Driving:  The [employer] cites drowsiness as a causing factor in more than 074,663 police reported crashes annually, resulting in 76,000 injuries and 1,500 deaths. Other surveys suggest 55% of people polled have driven while drowsy in the past year, 23% had fallen asleep but not crashed, 3% crashed, and 2% had and accident due to drowsy driving. Who is at risk? Young Drivers: One study of drowsy driving accidents states that 55% of the drivers were under 25 years. Of those, 75% were male. Shift Workers and Travelers: People who work overnight or travel across time zones frequently are at higher risk of experiencing Circadian Rhythm Disorders. They are trying to work and function when their body is programed to sleep. Sleep Deprived: Lack of sleep has a serious impact on your ability to pay attention or focus on a task. Consistently getting less than the average of 8 hours your body needs creates partial or cumulative sleep deprivation. Untreated Sleep Disorders: Sleep Apnea, Narcolepsy, R.L.S., and other sleep disorders (untreated) prevent a person from getting enough restful sleep. This leads to excessive daytime sleepiness and increases the risk for drowsy driving accidents by up to 7 times. Medications / Alcohol: Even over the counter medications can cause drowsiness. Medications that impair a drivers attention should have a warning label. Alcohol naturally makes you sleepy and on its own can cause accidents. Combined with excessive drowsiness its effects are amplified. Signs of Drowsy Driving:   * You don't remember driving the last few miles   * You may drift out of your raquel   * You are unable to focus and your thoughts wander   * You may yawn more often than normal   * You have difficulty keeping your eyes open / nodding off   * Missing traffic signs, speeding, or tailgating  Prevention-   Good sleep hygiene, lifestyle and behavioral choices have the most impact on drowsy driving. There is no substitute for sleep and the average person requires 8 hours nightly.  If you find yourself driving drowsy, stop and sleep. Consider the sleep hygiene tips provided during your visit as well. Medication Refill Policy: Refills for all medications require 1 week advance notice. Please have your pharmacy fax a refill request. We are unable to fax, or call in \"controled substance\" medications and you will need to pick these prescriptions up from our office. [employer]    Thank you for requesting access to SleepOut. Please follow the instructions below to securely access and download your online medical record. SleepOut allows you to send messages to your doctor, view your test results, renew your prescriptions, schedule appointments, and more. How Do I Sign Up? 1. In your internet browser, go to [email or web address]. 2. Click on the First Time User? Click Here link in the Sign In box. You will see the New Member Sign Up page. 3. Enter your SleepOut Access Code exactly as it appears below. You will not need to use this code after youve completed the sign-up process. If you do not sign up before the expiration date, you must request a new code. SleepOut Access Code: Activation code not generated  Current SleepOut Status: Active (This is the date your SleepOut access code will )    4. Enter the last four digits of your Social Security Number (xxxx) and Date of Birth (mm/dd/yyyy) as indicated and click Submit. You will be taken to the next sign-up page. 5. Create a SleepOut ID. This will be your SleepOut login ID and cannot be changed, so think of one that is secure and easy to remember. 6. Create a SleepOut password. You can change your password at any time. 7. Enter your Password Reset Question and Answer. This can be used at a later time if you forget your password. 8. Enter your e-mail address. You will receive e-mail notification when new information is available in [address] 9. Click Sign Up. You can now view and download portions of your medical record.   10. Click the Download Summary menu link to download a portable copy of your medical information. Additional Information    If you have questions, please call [phone]. Remember, [employer] is NOT to be used for urgent needs. For medical emergencies, dial 911.

## 2018-07-31 NOTE — PROGRESS NOTES
[address] [facility] [place]. [place], [address]  Tel.  [phone]  Fax. 100 [facility] 60  [place], [address] [facility]  Tel.  [phone]  Fax. [phone] [address] [facility] 151 Valentín Narvaez 33  Tel.  [phone]  Fax. [phone]         Subjective:      Tory Willard is an 29 y.o. female referred for evaluation for a sleep disorder. She complains of difficulty falling asleep several days a week associated with feels sleepy during the day, take naps during the day, frustration regarding her poor sleep quality. .  Symptoms began 8 months ago, gradually worsening since that time. She usually can fall asleep in 10-90 minutes. Family or house members note no significant snoring. She denies falling asleep while driving. Billie Miller does wake up frequently at night. She is bothered by waking up too early and left unable to get back to sleep. She actually sleeps about 6 hours at night and wakes up about 5 times during the night. She does work shifts: Other Shift. Billie indicates she   get too little sleep at night. Her bedtime is 2100. She awakens at 0630 (4.30 am on thur, fri). She does take naps. She takes 2 naps a week lasting 30 to 45. She naps for at least 2 hours on the weekends. She loves sleep. She used to sleep 9 hours and had no difficulties with her sleep prior to her joining the [employer]. On Thursdays and Fridays, she has to be at work at 5:30 so has to wake up at 4:30, she has difficulty sleeping on Sunday nights and Wednesdays and Thursdays. Other nights, she does not have difficulty. She is afraid she will be late for work. She gets in bed around 8:30 on Wednesdays and Thursdays. She has the following observed behaviors: Sleep talking; Nightmares. Other remarks: waking with gasp, vivid dreams    [place] Sleepiness Score: 18   which reflect moderate daytime drowsiness.     Allergies   Allergen Reactions    Cinnamon Anaphylaxis    Dilaudid [Hydromorphone] Hives and Swelling    Zofran (As Hydrochloride) [Ondansetron Hcl] Hives and Swelling    Sulfa (Sulfonamide Antibiotics) Hives and Nausea and Vomiting    Dilaudid [Hydromorphone (Bulk)] Hives    Imitrex [Sumatriptan Succinate] Nausea Only    Mushroom Vertigo    Red Dye Itching and Swelling     In eyes    Sulfa (Sulfonamide Antibiotics) Hives    Zofran [Ondansetron Hcl (Pf)] Hives         Current Outpatient Prescriptions:     norgestrel-ethinyl estradiol (LO/OVRAL) 0.3-30 mg-mcg tab, Take 1 Tab by mouth daily. , Disp: 84 Dose Pack, Rfl: 3    promethazine (PHENERGAN) 12.5 mg tablet, Take 1 Tab by mouth every six (6) hours as needed for Nausea., Disp: 20 Tab, Rfl: 0    metroNIDAZOLE (METROGEL) 0.75 % topical gel, Apply a thin layer to affected areas after washing once daily, Disp: 60 g, Rfl: 0    EPIDUO FORTE 0.3-2.5 % glwp, , Disp: , Rfl:     doxycycline (VIBRA-TABS) 100 mg tablet, , Disp: , Rfl:     EPINEPHrine (EPIPEN) 0.3 mg/0.3 mL injection, 0.3 mL by IntraMUSCular route once as needed for up to 1 dose., Disp: 1 Syringe, Rfl: 3    rizatriptan (MAXALT-MLT) 10 mg disintegrating tablet, 1 tab at onset of migraine; repeat 1 tab in 2 hours if HA remains; Limit: 2 tabs in 24 hours, max 3 days/ week. 30 Day Rx, Disp: 4 Tab, Rfl: 0    butalbital-acetaminophen-caffeine (FIORICET, ESGIC) -40 mg per tablet, 1-2 tabs at onset of migraine. Can repeat 1 tab in 4 hours if headache persists. Limit: 3 tabs/ day, max 3 days a week, Disp: 40 Tab, Rfl: 1    vitamin E (AQUA GEMS) 400 unit capsule, Take  by mouth daily. , Disp: , Rfl:      She  has a past medical history of Headache(784.0) and Migraine. She  has a past surgical history that includes hx wisdom teeth extraction (Dec 2014) and hx wisdom teeth extraction. She family history includes Diabetes in her maternal aunt and paternal aunt; Elevated Lipids in her father; Hypertension in her mother.     She  reports that she has never smoked. She has never used smokeless tobacco. She reports that she does not drink alcohol or use illicit drugs. Review of Systems:  Constitutional:  8 pound  weight gain. Eyes:  No blurred vision. CVS:  No significant chest pain  Pulm:  No significant shortness of breath  GI:  No significant nausea or vomiting  :  No significant nocturia  Musculoskeletal:  No significant joint pain at night  Skin:  No significant rashes  Neuro:  No significant dizziness   Psych:  She has been anxious about work since joining the [employer]    Sleep Review of Systems: notable for ++ difficulty falling asleep; infrequent awakenings at night;  regular vivid dreaming noted; occasional nightmares ; no early morning headaches; no memory problems; no concentration issues; no history of any automobile or occupational accidents due to daytime drowsiness. Objective:     Visit Vitals    /70    Pulse 77    Ht 5' 4\" (1.626 m)    Wt 112 lb (50.8 kg)    SpO2 99%    BMI 19.22 kg/m2         General:   Not in acute distress   Eyes:  Anicteric sclerae, no obvious strabismus   Nose:  No obvious nasal septum deviation    Oropharynx:   Class 3 oropharyngeal outlet,low-lying soft palate, narrow tonsilo-pharyngeal pilars   Tonsils:   tonsils are present and normal   Neck:   Neck circ. in \"inches\": 12.25; midline trachea   Chest/Lungs:  Equal lung expansion, clear on auscultation    CVS:  Normal rate, regular rhythm; no JVD   Skin:  Warm to touch; no obvious rashes   Neuro:  No focal deficits ; no obvious tremor    Psych:  Normal affect,  normal countenance;          Assessment:       ICD-10-CM ICD-9-CM    1. Insomnia, unspecified type G47.00 780.52    2. Anxiety F41.9 300.00          Plan:     * The patient currently has a Low Risk for having sleep apnea. STOP-BANG score 1.  *I have advised a regular sleep wake cycle. She should get in bed no earlier than (:30.  she  was advised to avoid looking at the clock during the night. Ideally, the clock face should be turned away. she was advised to minimize caffeine use and to avoid caffeine-containing beverages 8 hours prior to bedtime. Naps were discouraged. An intensified daily exercise schedule, at least 3 hours before bedtime, would be beneficial to improving sleep quality. she was advised to avoid evening light and to use sunglasses in the late afternoon. Watching TV, using laptops, tablets and smartphones in the evening was discouraged. She will cover her cable box and move her phone where she cannot see it.  she  was advised to keep the bedroom cool and dark. Relaxation strategies reviewed in detail. Hopefully she will be able to move the early days to a more reasonable start time. 2. Anxiety - I have encouraged her to contact her PMD's office for recommendations for counselors to help her develop coping strategies for her anxiety related to her new job responsibilities. .      All of her questions were addressed. Thank you for allowing us to participate in your patient's medical care.     If no significant improvement in 6-8 weeks after employing the above, she wll call and we will schedule an attended polysomnogram.   Electronically signed by    Kelli Watters MD  Diplomate in Sleep Medicine  [facility]

## 2018-07-31 NOTE — Clinical Note
Thank you for the referral.  I will keep you informed of her progress.  [address], [place]

## 2018-08-22 ENCOUNTER — TELEPHONE (OUTPATIENT)
Dept: NEUROLOGY | Age: 28
End: 2018-08-22

## 2018-08-22 NOTE — TELEPHONE ENCOUNTER
----- Message from Varun Vazquez sent at 8/22/2018  3:28 PM EDT -----  Regarding: Dr Sebastián Alonso is requesting a refill on her \"Fioricet -40 mg\" [facility] [address], [phone]    Best contact # [phone]

## 2018-10-08 ENCOUNTER — OFFICE VISIT (OUTPATIENT)
Dept: FAMILY MEDICINE CLINIC | Age: 28
End: 2018-10-08

## 2018-10-08 VITALS
WEIGHT: 113 LBS | BODY MASS INDEX: 19.29 KG/M2 | RESPIRATION RATE: 16 BRPM | TEMPERATURE: 98.7 F | HEIGHT: 64 IN | SYSTOLIC BLOOD PRESSURE: 103 MMHG | HEART RATE: 57 BPM | OXYGEN SATURATION: 98 % | DIASTOLIC BLOOD PRESSURE: 65 MMHG

## 2018-10-08 DIAGNOSIS — L70.9 ACNE, UNSPECIFIED ACNE TYPE: Primary | ICD-10-CM

## 2018-10-08 DIAGNOSIS — L50.9 URTICARIA: ICD-10-CM

## 2018-10-08 NOTE — PROGRESS NOTES
Chief Complaint Patient presents with [address] [place] [address] Sean Referral Request  
  dermatology, allergist due to breaking out in hives. she is a 29y.o. year old female who presents for evalution. Having hives off and on and wants allergy testing as well as have derm evaluate She ate [place] yesterday and started itching. She took benadryl and did ok but she was not sure that her tongue was totally the normal size before taking the benadryl Reviewed PmHx, RxHx, FmHx, SocHx, AllgHx and updated and dated in the chart. Aspirin yes ____   No____ N/A____ Patient Active Problem List  
 Diagnosis  Chronic migraine without aura, with intractable migraine, so stated, without mention of status migrainosus Nurse notes were reviewed and copied and are correct Review of Systems - negative except as listed above in the HPI Objective:  
 
Vitals:  
 10/08/18 1129 BP: 103/65 Pulse: (!) 57 Resp: 16 Temp: 98.7 °F (37.1 °C) TempSrc: Oral  
SpO2: 98% Weight: 113 lb (51.3 kg) Height: 5' 4\" (1.626 m) Physical Examination: General appearance - alert, well appearing, and in no distress and oriented to person, place, and time Mental status - alert, oriented to person, place, and time Skin - normal coloration and turgor, no rashes, no suspicious skin lesions noted Assessment/ Plan:  
Diagnoses and all orders for this visit: 
 
1. Acne, unspecified acne type 
-     REFERRAL TO DERMATOLOGY 2. Urticaria 
-     REFERRAL TO ALLERGY Follow-up Disposition: 
Return if symptoms worsen or fail to improve. ICD-10-CM ICD-9-CM 1. Acne, unspecified acne type L70.9 706.1 REFERRAL TO DERMATOLOGY 2. Urticaria L50.9 708.9 REFERRAL TO ALLERGY I have discussed the diagnosis with the patient and the intended plan as seen in the above orders. The patient has received an after-visit summary and questions were answered concerning future plans. Medication Side Effects and Warnings were discussed with patient: yes Patient Labs were reviewed and or requested: yes Patient Past Records were reviewed and or requested: yes There are no Patient Instructions on file for this visit. The patient verbalizes understanding and agrees with the plan of care Patient has the advanced directives booklet to review

## 2018-10-08 NOTE — MR AVS SNAPSHOT
[address] [facility] [address] 
[phone] Patient: Wes Ceja MRN: [id number] OEB:[birth year] Visit Information Date & Time Provider Department Dept. Phone Encounter #  
 10/8/2018 11:00 AM Colleen Foster MD [address] [place] [address] [id number] Follow-up Instructions Return if symptoms worsen or fail to improve. Upcoming Health Maintenance Date Due DTaP/Tdap/Td series (1 - Tdap) 3/1/2011 PAP AKA CERVICAL CYTOLOGY 3/1/2011 Influenza Age 5 to Adult 8/1/2018 Allergies as of 10/8/2018  Review Complete On: 10/8/2018 By: Colleen Foster MD  
  
 Severity Noted Reaction Type Reactions Cinnamon High 10/25/2016    Anaphylaxis Dilaudid [Hydromorphone] High 12/22/2015    Hives, Swelling Zofran (As Hydrochloride) [Ondansetron Hcl] High 12/22/2015    Hives, Swelling Sulfa (Sulfonamide Antibiotics) Medium 12/22/2015    Hives, Nausea and Vomiting Dilaudid [Hydromorphone (Bulk)]  02/06/2014    Hives Imitrex [Sumatriptan Succinate]  04/18/2014    Nausea Only Mushroom  10/25/2016    Vertigo Red Dye  01/16/2017    Itching, Swelling In eyes Sulfa (Sulfonamide Antibiotics)  02/06/2014    Hives Zofran [Ondansetron Hcl (Pf)]  02/06/2014    Hives Current Immunizations  Reviewed on 10/3/2016 Name Date Influenza Vaccine (Quad) PF 10/9/2017, 10/3/2016 Not reviewed this visit You Were Diagnosed With   
  
 Codes Comments Acne, unspecified acne type    -  Primary ICD-10-CM: L70.9 ICD-9-CM: 706.1 Urticaria     ICD-10-CM: L50.9 ICD-9-CM: 708. 9 Vitals BP Pulse Temp Resp Height(growth percentile) Weight(growth percentile) 103/65 (!) 57 98.7 °F (37.1 °C) (Oral) 16 5' 4\" (1.626 m) 113 lb (51.3 kg) LMP SpO2 BMI OB Status Smoking Status 09/12/2018 98% 19.4 kg/m2 Having regular periods Never Smoker Vitals History BMI and BSA Data Body Mass Index Body Surface Area  
 19.4 kg/m 2 1.52 m 2 Preferred Pharmacy Pharmacy Name Phone [facility] [address] [facility],  [place] [address] [place] [address] [phone] Your Updated Medication List  
  
   
This list is accurate as of 10/8/18 11:56 AM.  Always use your most recent med list.  
  
  
  
  
 butalbital-acetaminophen-caffeine -40 mg per tablet Commonly known as:  FIORICET, ESGIC  
1-2 tabs at onset of migraine. Can repeat 1 tab in 4 hours if headache persists. Limit: 3 tabs/ day, max 3 days a week  
  
 doxycycline 100 mg tablet Commonly known as:  VIBRA-TABS  
  
 EPIDUO FORTE 0.3-2.5 % Glwp Generic drug:  adapalene-benzoyl peroxide EPINEPHrine 0.3 mg/0.3 mL injection Commonly known as:  EPIPEN  
0.3 mL by IntraMUSCular route once as needed for up to 1 dose. metroNIDAZOLE 0.75 % topical gel Commonly known as:  Magalis Achilles Apply a thin layer to affected areas after washing once daily  
  
 norgestrel-ethinyl estradiol 0.3-30 mg-mcg Tab Commonly known as:  LO/OVRAL Take 1 Tab by mouth daily. promethazine 12.5 mg tablet Commonly known as:  PHENERGAN Take 1 Tab by mouth every six (6) hours as needed for Nausea. rizatriptan 10 mg disintegrating tablet Commonly known as:  MAXALT-MLT 1 tab at onset of migraine; repeat 1 tab in 2 hours if HA remains; Limit: 2 tabs in 24 hours, max 3 days/ week. 30 Day Rx  
  
 vitamin E 400 unit capsule Commonly known as:  [address] Take  by mouth daily. We Performed the Following REFERRAL TO ALLERGY [REF5 Custom] Comments:  
 eval and treat for possible food allergy leading to hives and itching REFERRAL TO DERMATOLOGY [REF19 Custom] Comments:  
 Please evaluate patient for eval and treat acne Follow-up Instructions Return if symptoms worsen or fail to improve. Referral Information Referral ID Referred By Referred To  
  
 [id number] R[facility] [address] Thelma Caba Phone: [phone] Fax: [phone] Visits Status Start Date End Date 1 New Request 10/8/18 10/8/19 If your referral has a status of pending review or denied, additional information will be sent to support the outcome of this decision. Referral ID Referred By Referred To  
 [id number] Darinel Conway MD  
   [address] [place] [address] Valentín Narvaez 33 Phone: [phone] Fax: [phone] Visits Status Start Date End Date 1 New Request 10/8/18 10/8/19 If your referral has a status of pending review or denied, additional information will be sent to support the outcome of this decision. Introducing [facility]! Dear Howard Fournier: Thank you for requesting a "[employer]" account. Our records indicate that you already have an active "[employer]" account. You can access your account anytime at [email or web address] Did you know that you can access your hospital and ER discharge instructions at any time in "[employer]"? You can also review all of your test results from your hospital stay or ER visit. Additional Information If you have questions, please visit the Frequently Asked Questions section of the "[employer]" website at [email or web address] Remember, "[employer]" is NOT to be used for urgent needs. For medical emergencies, dial 911. Now available from your iPhone and Android! Please provide this summary of care documentation to your next provider. Your primary care clinician is listed as Humaira Rivera. If you have any questions after today's visit, please call [phone].

## 2018-10-08 NOTE — PROGRESS NOTES
1. Have you been to the ER, urgent care clinic since your last visit? Hospitalized since your last visit? No 
 
2. Have you seen or consulted any other health care providers outside of the [address] [place] [address] since your last visit? Include any pap smears or colon screening. No  
 
Chief Complaint Patient presents with [facility] Referral Request  
  dermatology, allergist due to breaking out in hives.

## 2019-05-24 ENCOUNTER — OFFICE VISIT (OUTPATIENT)
Dept: FAMILY MEDICINE CLINIC | Age: 29
End: 2019-05-24

## 2019-05-24 VITALS
SYSTOLIC BLOOD PRESSURE: 112 MMHG | DIASTOLIC BLOOD PRESSURE: 79 MMHG | BODY MASS INDEX: 19.36 KG/M2 | WEIGHT: 113.4 LBS | TEMPERATURE: 98.6 F | RESPIRATION RATE: 18 BRPM | HEART RATE: 68 BPM | OXYGEN SATURATION: 98 % | HEIGHT: 64 IN

## 2019-05-24 DIAGNOSIS — F41.9 ANXIETY: Primary | ICD-10-CM

## 2019-05-24 NOTE — PROGRESS NOTES
Kinjal Crabtree is a 34 y.o. female    Chief Complaint   Patient presents with    Anxiety       1. Have you been to the ER, urgent care clinic since your last visit? Hospitalized since your last visit? Yes pt went to urgent care 5/23 for a fall on the treadmill. Nothing broken just a few bruises   [facility]    2. Have you seen or consulted any other health care providers outside of the [address] [place] [address] since your last visit? Include any pap smears or colon screening.  No

## 2019-05-24 NOTE — PROGRESS NOTES
.  Chief Complaint   Patient presents with    Anxiety     she is a 34y.o. year old female who presents for evalution. Recently feeling more anxious  She also joined the [employer] there is a lot of changes  The anxiety is affcating her personal relationships  She says she feels like she has achieved the gals she had set for herself and she feels like she does not know jossie to be working on next and this makes her very anxious  Reviewed PmHx, RxHx, FmHx, SocHx, AllgHx and updated and dated in the chart. Aspirin yes ____   No____ N/A____    Patient Active Problem List    Diagnosis    Chronic migraine without aura, with intractable migraine, so stated, without mention of status migrainosus       Nurse notes were reviewed and copied and are correct  Review of Systems - negative except as listed above in the HPI    Objective:     Vitals:    05/24/19 1104   BP: 112/79   Pulse: 68   Resp: 18   Temp: 98.6 °F (37 °C)   TempSrc: Oral   SpO2: 98%   Weight: 113 lb 6.4 oz (51.4 kg)   Height: 5' 4\" (1.626 m)       Physical Examination: General appearance - alert, well appearing, and in no distress and oriented to person, place, and time  Mental status - alert, oriented to person, place, and time  Chest - clear to auscultation, no wheezes, rales or rhonchi, symmetric air entry  Heart - normal rate, regular rhythm, normal S1, S2, no murmurs, rubs, clicks or gallops      Assessment/ Plan:   Diagnoses and all orders for this visit:    1. Anxiety  -     REFERRAL TO PSYCHOLOGY       Follow-up and Dispositions    · Return if symptoms worsen or fail to improve. ICD-10-CM ICD-9-CM    1. Anxiety F41.9 300.00 REFERRAL TO PSYCHOLOGY       I have discussed the diagnosis with the patient and the intended plan as seen in the above orders. The patient has received an after-visit summary and questions were answered concerning future plans.      Medication Side Effects and Warnings were discussed with patient: yes  Patient Labs were reviewed and or requested: yes  Patient Past Records were reviewed and or requested: yes        There are no Patient Instructions on file for this visit.     The patient verbalizes understanding and agrees with the plan of care        Patient has the advanced directives booklet to review

## 2019-07-30 ENCOUNTER — OFFICE VISIT (OUTPATIENT)
Dept: FAMILY MEDICINE CLINIC | Age: 29
End: 2019-07-30

## 2019-07-30 VITALS
HEART RATE: 58 BPM | DIASTOLIC BLOOD PRESSURE: 70 MMHG | HEIGHT: 64 IN | TEMPERATURE: 98.4 F | RESPIRATION RATE: 17 BRPM | BODY MASS INDEX: 19.29 KG/M2 | OXYGEN SATURATION: 98 % | SYSTOLIC BLOOD PRESSURE: 100 MMHG | WEIGHT: 113 LBS

## 2019-07-30 DIAGNOSIS — Z13.220 SCREENING, LIPID: ICD-10-CM

## 2019-07-30 DIAGNOSIS — G43.009 MIGRAINE WITHOUT AURA AND WITHOUT STATUS MIGRAINOSUS, NOT INTRACTABLE: ICD-10-CM

## 2019-07-30 DIAGNOSIS — Z91.018 MULTIPLE FOOD ALLERGIES: ICD-10-CM

## 2019-07-30 DIAGNOSIS — R53.83 FATIGUE, UNSPECIFIED TYPE: Primary | ICD-10-CM

## 2019-07-30 RX ORDER — NORETHINDRONE ACETATE AND ETHINYL ESTRADIOL, AND FERROUS FUMARATE 1MG-20(24)
KIT ORAL
COMMUNITY
End: 2019-07-30

## 2019-07-30 NOTE — PROGRESS NOTES
1. Have you been to the ER, urgent care clinic since your last visit? Hospitalized since your last visit? No     2. Have you seen or consulted any other health care providers outside of the [address] [place] [address] since your last visit? Include any pap smears or colon screening. Yes, patient went to GYN.     Chief Complaint   Patient presents with    Complete Physical

## 2019-07-30 NOTE — PATIENT INSTRUCTIONS
Fatigue: Care Instructions  Your Care Instructions    Fatigue is a feeling of tiredness, exhaustion, or lack of energy. You may feel fatigue because of too much or not enough activity. It can also come from stress, lack of sleep, boredom, and poor diet. Many medical problems, such as viral infections, can cause fatigue. Emotional problems, especially depression, are often the cause of fatigue. Fatigue is most often a symptom of another problem. Treatment for fatigue depends on the cause. For example, if you have fatigue because you have a certain health problem, treating this problem also treats your fatigue. If depression or anxiety is the cause, treatment may help. Follow-up care is a key part of your treatment and safety. Be sure to make and go to all appointments, and call your doctor if you are having problems. It's also a good idea to know your test results and keep a list of the medicines you take. How can you care for yourself at home? · Get regular exercise. But don't overdo it. Go back and forth between rest and exercise. · Get plenty of rest.  · Eat a healthy diet. Do not skip meals, especially breakfast.  · Reduce your use of caffeine, tobacco, and alcohol. Caffeine is most often found in coffee, tea, cola drinks, and chocolate. · Limit medicines that can cause fatigue. This includes tranquilizers and cold and allergy medicines. When should you call for help? Watch closely for changes in your health, and be sure to contact your doctor if:    · You have new symptoms such as fever or a rash.     · Your fatigue gets worse.     · You have been feeling down, depressed, or hopeless. Or you may have lost interest in things that you usually enjoy.     · You are not getting better as expected. Where can you learn more? Go to http://patrice-florencio.info/. Enter P644 in the search box to learn more about \"Fatigue: Care Instructions. \"  Current as of: September 23, 2018  Content Version: 12.1  © 1006-6071 Healthwise, [employer]. Care instructions adapted under license by [employer] (which disclaims liability or warranty for this information). If you have questions about a medical condition or this instruction, always ask your healthcare professional. [address] any warranty or liability for your use of this information. Migraine Headache: Care Instructions  Your Care Instructions  Migraines are painful, throbbing headaches that often start on one side of the head. They may cause nausea and vomiting and make you sensitive to light, sound, or smell. Without treatment, migraines can last from 4 hours to a few days. Medicines can help prevent migraines or stop them after they have started. Your doctor can help you find which ones work best for you. Follow-up care is a key part of your treatment and safety. Be sure to make and go to all appointments, and call your doctor if you are having problems. It's also a good idea to know your test results and keep a list of the medicines you take. How can you care for yourself at home? · Do not drive if you have taken a prescription pain medicine. · Rest in a quiet, dark room until your headache is gone. Close your eyes, and try to relax or go to sleep. Don't watch TV or read. · Put a cold, moist cloth or cold pack on the painful area for 10 to 20 minutes at a time. Put a thin cloth between the cold pack and your skin. · Use a warm, moist towel or a heating pad set on low to relax tight shoulder and neck muscles. · Have someone gently massage your neck and shoulders. · Take your medicines exactly as prescribed. Call your doctor if you think you are having a problem with your medicine. You will get more details on the specific medicines your doctor prescribes. · Be careful not to take pain medicine more often than the instructions allow.  You could get worse or more frequent headaches when the medicine wears off. To prevent migraines  · Keep a headache diary so you can figure out what triggers your headaches. Avoiding triggers may help you prevent headaches. Record when each headache began, how long it lasted, and what the pain was like. (Was it throbbing, aching, stabbing, or dull?) Write down any other symptoms you had with the headache, such as nausea, flashing lights or dark spots, or sensitivity to bright light or loud noise. Note if the headache occurred near your period. List anything that might have triggered the headache. Triggers may include certain foods (chocolate, cheese, wine) or odors, smoke, bright light, stress, or lack of sleep. · If your doctor has prescribed medicine for your migraines, take it as directed. You may have medicine that you take only when you get a migraine and medicine that you take all the time to help prevent migraines. ? If your doctor has prescribed medicine for when you get a headache, take it at the first sign of a migraine, unless your doctor has given you other instructions. ? If your doctor has prescribed medicine to prevent migraines, take it exactly as prescribed. Call your doctor if you think you are having a problem with your medicine. · Find healthy ways to deal with stress. Migraines are most common during or right after stressful times. Take time to relax before and after you do something that has caused a migraine in the past.  · Try to keep your muscles relaxed by keeping good posture. Check your jaw, face, neck, and shoulder muscles for tension. Try to relax them. When you sit at a desk, change positions often. And make sure to stretch for 30 seconds each hour. · Get plenty of sleep and exercise. · Eat meals on a regular schedule. Avoid foods and drinks that often trigger migraines.  These include chocolate, alcohol (especially red wine and port), aspartame, monosodium glutamate (MSG), and some additives found in foods (such as hot dogs, riggs, cold cuts, aged cheeses, and pickled foods). · Limit caffeine. Don't drink too much coffee, tea, or soda. But don't quit caffeine suddenly. That can also give you migraines. · Do not smoke or allow others to smoke around you. If you need help quitting, talk to your doctor about stop-smoking programs and medicines. These can increase your chances of quitting for good. · If you are taking birth control pills or hormone therapy, talk to your doctor about whether they are triggering your migraines. When should you call for help? Call 911 anytime you think you may need emergency care. For example, call if:    · You have signs of a stroke. These may include:  ? Sudden numbness, paralysis, or weakness in your face, arm, or leg, especially on only one side of your body. ? Sudden vision changes. ? Sudden trouble speaking. ? Sudden confusion or trouble understanding simple statements. ? Sudden problems with walking or balance. ? A sudden, severe headache that is different from past headaches.    Call your doctor now or seek immediate medical care if:    · You have new or worse nausea and vomiting.     · You have a new or higher fever.     · Your headache gets much worse.    Watch closely for changes in your health, and be sure to contact your doctor if:    · You are not getting better after 2 days (48 hours). Where can you learn more? Go to http://patrice-florencio.info/. Enter Q465 in the search box to learn more about \"Migraine Headache: Care Instructions. \"  Current as of: March 28, 2019  Content Version: 12.1  © 7407-4494 Healthwise, [employer]. Care instructions adapted under license by [employer] (which disclaims liability or warranty for this information). If you have questions about a medical condition or this instruction, always ask your healthcare professional. [address] any warranty or liability for your use of this information.

## 2019-07-31 LAB
25(OH)D3+25(OH)D2 SERPL-MCNC: 16.9 NG/ML (ref 30–100)
ALBUMIN SERPL-MCNC: 4.4 G/DL (ref 3.5–5.5)
ALBUMIN/GLOB SERPL: 1.6 {RATIO} (ref 1.2–2.2)
ALP SERPL-CCNC: 53 IU/L (ref 39–117)
ALT SERPL-CCNC: 10 IU/L (ref 0–32)
AST SERPL-CCNC: 14 IU/L (ref 0–40)
BILIRUB SERPL-MCNC: 0.3 MG/DL (ref 0–1.2)
BUN SERPL-MCNC: 10 MG/DL (ref 6–20)
BUN/CREAT SERPL: 14 (ref 9–23)
CALCIUM SERPL-MCNC: 9.5 MG/DL (ref 8.7–10.2)
CHLORIDE SERPL-SCNC: 102 MMOL/L (ref 96–106)
CHOLEST SERPL-MCNC: 241 MG/DL (ref 100–199)
CO2 SERPL-SCNC: 22 MMOL/L (ref 20–29)
CREAT SERPL-MCNC: 0.71 MG/DL (ref 0.57–1)
ERYTHROCYTE [DISTWIDTH] IN BLOOD BY AUTOMATED COUNT: 13.9 % (ref 12.3–15.4)
GLOBULIN SER CALC-MCNC: 2.8 G/DL (ref 1.5–4.5)
GLUCOSE SERPL-MCNC: 84 MG/DL (ref 65–99)
HCT VFR BLD AUTO: 39.5 % (ref 34–46.6)
HDLC SERPL-MCNC: 77 MG/DL
HGB BLD-MCNC: 12.4 G/DL (ref 11.1–15.9)
INTERPRETATION, 910389: NORMAL
LDLC SERPL CALC-MCNC: 148 MG/DL (ref 0–99)
MCH RBC QN AUTO: 28 PG (ref 26.6–33)
MCHC RBC AUTO-ENTMCNC: 31.4 G/DL (ref 31.5–35.7)
MCV RBC AUTO: 89 FL (ref 79–97)
PLATELET # BLD AUTO: 297 X10E3/UL (ref 150–450)
POTASSIUM SERPL-SCNC: 4.4 MMOL/L (ref 3.5–5.2)
PROT SERPL-MCNC: 7.2 G/DL (ref 6–8.5)
RBC # BLD AUTO: 4.43 X10E6/UL (ref 3.77–5.28)
SODIUM SERPL-SCNC: 141 MMOL/L (ref 134–144)
TRIGL SERPL-MCNC: 78 MG/DL (ref 0–149)
TSH SERPL DL<=0.005 MIU/L-ACNC: 1.37 UIU/ML (ref 0.45–4.5)
VLDLC SERPL CALC-MCNC: 16 MG/DL (ref 5–40)
WBC # BLD AUTO: 7.6 X10E3/UL (ref 3.4–10.8)

## 2019-08-02 RX ORDER — ERGOCALCIFEROL 1.25 MG/1
50000 CAPSULE ORAL
Qty: 12 CAP | Refills: 0 | Status: SHIPPED | OUTPATIENT
Start: 2019-08-02

## 2019-08-02 NOTE — PROGRESS NOTES
Blood counts normal.  Thyroid normal.  Cholesterol is more elevated than before. Recommend close adherence to heart healthy diet plan, 150 minutes of moderate intensity exercise weekly to improve. Recheck fasting in 6-12 months. Vit D levels low- rx for weekly high dose replacement sent to pharmacy on record, RTC in 12 weeks for recheck of levels. This may be contributing to your fatigue.   Blood chemistry normal.

## 2019-08-02 NOTE — PROGRESS NOTES
Varghese Thomson is a 34 y.o. female who presents with the following complaints:  Chief Complaint   Patient presents with    Fatigue       Subjective:    HPI:   C/o fatigue, ongoing for several months, low energy. No decreased in tolerance for physical activity, no lightheadedness, no chest pain or dyspnea on exerttion. No palpitations or elevated HR.        Pertinent PMH/FH/SH:  Past Medical History:   Diagnosis Date    Headache(784.0)     Migraine      Past Surgical History:   Procedure Laterality Date   [de-identified] WISDOM TEETH EXTRACTION  Dec 2014    HX WISDOM TEETH EXTRACTION       Family History   Problem Relation Age of Onset    Hypertension Mother    [facility] Elevated Lipids Father     Diabetes Maternal Aunt     Diabetes Paternal Aunt      Social History     Socioeconomic History    Marital status: SINGLE     Spouse name: Not on file    Number of children: Not on file    Years of education: Not on file    Highest education level: Not on file   Tobacco Use    Smoking status: Never Smoker    Smokeless tobacco: Never Used   Substance and Sexual Activity    Alcohol use: No     Alcohol/week: 1.7 standard drinks     Types: 2 Standard drinks or equivalent per week     Comment: ccasionally    Drug use: No    Sexual activity: Yes     Partners: Male   Social History Narrative    ** Merged History Encounter **          Advanced Directives: N      Patient Active Problem List    Diagnosis    Chronic migraine without aura, with intractable migraine, so stated, without mention of status migrainosus       Nurse notes were reviewed and are correct  Review of Systems - negative except as listed above in the HPI    Objective:     Vitals:    07/30/19 0814   BP: 100/70   Pulse: (!) 58   Resp: 17   Temp: 98.4 °F (36.9 °C)   TempSrc: Oral   SpO2: 98%   Weight: 113 lb (51.3 kg)   Height: 5' 4\" (1.626 m)     Physical Examination: General appearance - alert, well appearing, and in no distress, oriented to person, place, and time and well hydrated  Mental status - normal mood, behavior, speech, dress, motor activity, and thought processes  Neck - supple, no significant adenopathy, thyroid exam: thyroid is normal in size without nodules or tenderness  Chest - clear to auscultation, no wheezes, rales or rhonchi, symmetric air entry  Heart - normal rate, regular rhythm, normal S1, S2, no murmurs, rubs, clicks or gallops, no JVD  Abdomen - soft, nontender, nondistended, no masses or organomegaly  bowel sounds normal  Neurological - alert, oriented, normal speech, no focal findings or movement disorder noted  Extremities - peripheral pulses normal, no pedal edema  Skin - normal coloration and turgor, no rashes, no suspicious skin lesions noted    Assessment/ Plan:   Diagnoses and all orders for this visit:    1. Fatigue, unspecified type  Anemia vs thyroid dysfucntion vs other  -     CBC W/O DIFF  -     TSH 3RD GENERATION  -     VITAMIN D, 25 HYDROXY  -     METABOLIC PANEL, COMPREHENSIVE  -     ergocalciferol (ERGOCALCIFEROL) 50,000 unit capsule; Take 1 Cap by mouth every seven (7) days. 2. Multiple food allergies  Avoid triggers  Epi pen prn    3. Migraine without aura and without status migrainosus, not intractable  Infrequent symptoms  maxalt prn    4. Screening, lipid  -     LIPID PANEL    Other orders  -     CVD REPORT       Follow-up and Dispositions    · Return in about 3 months (around 10/30/2019), or if symptoms worsen or fail to improve. I have discussed the diagnosis with the patient and the intended plan as seen in the above orders. The patient has received an after-visit summary and questions were answered concerning future plans. The patient verbalizes understanding.     Medication Side Effects and Warnings were discussed with patient: yes  Patient Labs were reviewed and or requested: yes  Patient Past Records were reviewed and or requested: yes    Patient Instructions          Fatigue: Care Instructions  Your Care Instructions    Fatigue is a feeling of tiredness, exhaustion, or lack of energy. You may feel fatigue because of too much or not enough activity. It can also come from stress, lack of sleep, boredom, and poor diet. Many medical problems, such as viral infections, can cause fatigue. Emotional problems, especially depression, are often the cause of fatigue. Fatigue is most often a symptom of another problem. Treatment for fatigue depends on the cause. For example, if you have fatigue because you have a certain health problem, treating this problem also treats your fatigue. If depression or anxiety is the cause, treatment may help. Follow-up care is a key part of your treatment and safety. Be sure to make and go to all appointments, and call your doctor if you are having problems. It's also a good idea to know your test results and keep a list of the medicines you take. How can you care for yourself at home? · Get regular exercise. But don't overdo it. Go back and forth between rest and exercise. · Get plenty of rest.  · Eat a healthy diet. Do not skip meals, especially breakfast.  · Reduce your use of caffeine, tobacco, and alcohol. Caffeine is most often found in coffee, tea, cola drinks, and chocolate. · Limit medicines that can cause fatigue. This includes tranquilizers and cold and allergy medicines. When should you call for help? Watch closely for changes in your health, and be sure to contact your doctor if:    · You have new symptoms such as fever or a rash.     · Your fatigue gets worse.     · You have been feeling down, depressed, or hopeless. Or you may have lost interest in things that you usually enjoy.     · You are not getting better as expected. Where can you learn more? Go to http://patrice-florencio.info/. Enter E997 in the search box to learn more about \"Fatigue: Care Instructions. \"  Current as of: September 23, 2018  Content Version: 12.1  © 9479-8330 Healthwise, Incorporated. Care instructions adapted under license by [employer] (which disclaims liability or warranty for this information). If you have questions about a medical condition or this instruction, always ask your healthcare professional. [address] any warranty or liability for your use of this information. Migraine Headache: Care Instructions  Your Care Instructions  Migraines are painful, throbbing headaches that often start on one side of the head. They may cause nausea and vomiting and make you sensitive to light, sound, or smell. Without treatment, migraines can last from 4 hours to a few days. Medicines can help prevent migraines or stop them after they have started. Your doctor can help you find which ones work best for you. Follow-up care is a key part of your treatment and safety. Be sure to make and go to all appointments, and call your doctor if you are having problems. It's also a good idea to know your test results and keep a list of the medicines you take. How can you care for yourself at home? · Do not drive if you have taken a prescription pain medicine. · Rest in a quiet, dark room until your headache is gone. Close your eyes, and try to relax or go to sleep. Don't watch TV or read. · Put a cold, moist cloth or cold pack on the painful area for 10 to 20 minutes at a time. Put a thin cloth between the cold pack and your skin. · Use a warm, moist towel or a heating pad set on low to relax tight shoulder and neck muscles. · Have someone gently massage your neck and shoulders. · Take your medicines exactly as prescribed. Call your doctor if you think you are having a problem with your medicine. You will get more details on the specific medicines your doctor prescribes. · Be careful not to take pain medicine more often than the instructions allow. You could get worse or more frequent headaches when the medicine wears off.   To prevent migraines  · Keep a headache diary so you can figure out what triggers your headaches. Avoiding triggers may help you prevent headaches. Record when each headache began, how long it lasted, and what the pain was like. (Was it throbbing, aching, stabbing, or dull?) Write down any other symptoms you had with the headache, such as nausea, flashing lights or dark spots, or sensitivity to bright light or loud noise. Note if the headache occurred near your period. List anything that might have triggered the headache. Triggers may include certain foods (chocolate, cheese, wine) or odors, smoke, bright light, stress, or lack of sleep. · If your doctor has prescribed medicine for your migraines, take it as directed. You may have medicine that you take only when you get a migraine and medicine that you take all the time to help prevent migraines. ? If your doctor has prescribed medicine for when you get a headache, take it at the first sign of a migraine, unless your doctor has given you other instructions. ? If your doctor has prescribed medicine to prevent migraines, take it exactly as prescribed. Call your doctor if you think you are having a problem with your medicine. · Find healthy ways to deal with stress. Migraines are most common during or right after stressful times. Take time to relax before and after you do something that has caused a migraine in the past.  · Try to keep your muscles relaxed by keeping good posture. Check your jaw, face, neck, and shoulder muscles for tension. Try to relax them. When you sit at a desk, change positions often. And make sure to stretch for 30 seconds each hour. · Get plenty of sleep and exercise. · Eat meals on a regular schedule. Avoid foods and drinks that often trigger migraines.  These include chocolate, alcohol (especially red wine and port), aspartame, monosodium glutamate (MSG), and some additives found in foods (such as hot dogs, riggs, cold cuts, aged cheeses, and pickled foods). · Limit caffeine. Don't drink too much coffee, tea, or soda. But don't quit caffeine suddenly. That can also give you migraines. · Do not smoke or allow others to smoke around you. If you need help quitting, talk to your doctor about stop-smoking programs and medicines. These can increase your chances of quitting for good. · If you are taking birth control pills or hormone therapy, talk to your doctor about whether they are triggering your migraines. When should you call for help? Call 911 anytime you think you may need emergency care. For example, call if:    · You have signs of a stroke. These may include:  ? Sudden numbness, paralysis, or weakness in your face, arm, or leg, especially on only one side of your body. ? Sudden vision changes. ? Sudden trouble speaking. ? Sudden confusion or trouble understanding simple statements. ? Sudden problems with walking or balance. ? A sudden, severe headache that is different from past headaches.    Call your doctor now or seek immediate medical care if:    · You have new or worse nausea and vomiting.     · You have a new or higher fever.     · Your headache gets much worse.    Watch closely for changes in your health, and be sure to contact your doctor if:    · You are not getting better after 2 days (48 hours). Where can you learn more? Go to http://patrice-florencio.info/. Enter C722 in the search box to learn more about \"Migraine Headache: Care Instructions. \"  Current as of: March 28, 2019  Content Version: 12.1  © 1090-7815 Healthwise, Incorporated. Care instructions adapted under license by [employer] (which disclaims liability or warranty for this information). If you have questions about a medical condition or this instruction, always ask your healthcare professional. [place] [address] any warranty or liability for your use of this information.             Sri REYES

## 2024-08-14 NOTE — MR AVS SNAPSHOT
[address] [address] 
[phone] Patient: Nancy Allen MRN: [id number] Visit Information Date & Time Provider Department Dept. Phone Encounter #  
 6/13/2018  4:15 PM Jeff Watkins  [facility] [phone] [id number] Follow-up Instructions Return if symptoms worsen or fail to improve. Your Appointments 6/13/2018  4:15 PM  
[place] [address] [facility] ([address]) Appt Note: Pt has sharp pain on right side in stomach. Offered backline. [place] [id number]  
[facility] [address] 7/31/2018  2:20 PM  
Any with Darcy Serrano MD  
[address] [facility] ([address] [facility]) Appt Note: NP_ref by Dr. Rangel Mode of energy, weight gain_[employer] [address] [facility] [address] [phone] [address] [facility] [phone] Upcoming Health Maintenance Date Due DTaP/Tdap/Td series (1 - Tdap) 3/1/2011 PAP AKA CERVICAL CYTOLOGY 3/1/2011 Influenza Age 5 to Adult 8/1/2018 Allergies as of 6/13/2018  Review Complete On: 6/13/2018 By: Gunner Hernandez LPN Severity Noted Reaction Type Reactions Cinnamon High 10/25/2016    Anaphylaxis Dilaudid [Hydromorphone] High 12/22/2015    Hives, Swelling Zofran (As Hydrochloride) [Ondansetron Hcl] High 12/22/2015    Hives, Swelling Sulfa (Sulfonamide Antibiotics) Medium 12/22/2015    Hives, Nausea and Vomiting Dilaudid [Hydromorphone (Bulk)]  02/06/2014    Hives Imitrex [Sumatriptan Succinate]  04/18/2014    Nausea Only Mushroom  10/25/2016    Vertigo Red Dye  01/16/2017    Itching, Swelling In eyes Sulfa (Sulfonamide Antibiotics)  02/06/2014    Hives Zofran [Ondansetron Hcl (Pf)]  02/06/2014    Hives Current Immunizations  Reviewed on 10/3/2016 Name Date Influenza Vaccine (Quad) PF 10/9/2017, 10/3/2016 Not reviewed this visit You Were Diagnosed With   
  
 Codes Comments Acute gastroenteritis    -  Primary ICD-10-CM: K52.9 ICD-9-CM: 558.9 Sore throat     ICD-10-CM: J02.9 ICD-9-CM: 477 Generalized abdominal pain     ICD-10-CM: R10.84 ICD-9-CM: 789.07 Vitals BP Pulse Temp Resp Height(growth percentile) Weight(growth percentile) 107/70 79 98.3 °F (36.8 °C) (Oral) 18 5' 4\" (1.626 m) 111 lb 14.4 oz (50.8 kg) LMP SpO2 BMI OB Status Smoking Status 05/22/2018 (Exact Date) 98% 19.21 kg/m2 Having regular periods Never Smoker BMI and BSA Data Body Mass Index Body Surface Area  
 19.21 kg/m 2 1.51 m 2 [phone] Your Updated Medication List  
  
   
This list is accurate as of 6/13/18  3:09 PM.  Always use your most recent med list.  
  
  
  
  
 butalbital-acetaminophen-caffeine -40 mg per tablet Commonly known as:  FIORICET, ESGIC  
1-2 tabs at onset of migraine. Can repeat 1 tab in 4 hours if headache persists. Limit: 3 tabs/ day, max 3 days a week  
  
 doxycycline 100 mg tablet Commonly known as:  VIBRA-TABS  
  
 EPIDUO FORTE 0.3-2.5 % Glwp Generic drug:  adapalene-benzoyl peroxide EPINEPHrine 0.3 mg/0.3 mL injection Commonly known as:  EPIPEN  
0.3 mL by IntraMUSCular route once as needed for up to 1 dose. metroNIDAZOLE 0.75 % topical gel Commonly known as:  [place] Apply a thin layer to affected areas after washing once daily  
  
 norgestrel-ethinyl estradiol 0.3-30 mg-mcg Tab Commonly known as:  LO/OVRAL Take 1 Tab by mouth daily. promethazine 12.5 mg tablet Commonly known as:  PHENERGAN Take 1 Tab by mouth every six (6) hours as needed for Nausea. rizatriptan 10 mg disintegrating tablet Commonly known as:  MAXALT-MLT 1 tab at onset of migraine; repeat 1 tab in 2 hours if HA remains; Limit: 2 tabs in 24 hours, max 3 days/ week. 30 Day Rx  
  
 vitamin E 400 unit capsule Commonly known as:  [address] Take  by mouth daily. Prescriptions Sent to Pharmacy Refills  
 promethazine (PHENERGAN) 12.5 mg tablet 0 Sig: Take 1 Tab by mouth every six (6) hours as needed for Nausea. Class: Normal  
 Pharmacy: [address] [place] [address], [address] [place] [address] #: [phone] Route: Oral  
  
We Performed the Following AMB POC RAPID STREP A [26773 CPT(R)] Follow-up Instructions Return if symptoms worsen or fail to improve. Patient Instructions Gastroenteritis: Care Instructions Your Care Instructions Gastroenteritis is an illness that may cause nausea, vomiting, and diarrhea. It is sometimes called \"stomach flu. \" It can be caused by bacteria or a virus. You will probably begin to feel better in 1 to 2 days. In the meantime, get plenty of rest and make sure you do not become dehydrated. Dehydration occurs when your body loses too much fluid. Follow-up care is a key part of your treatment and safety. Be sure to make and go to all appointments, and call your doctor if you are having problems. It's also a good idea to know your test results and keep a list of the medicines you take. How can you care for yourself at home? · If your doctor prescribed antibiotics, take them as directed. Do not stop taking them just because you feel better. You need to take the full course of antibiotics. · Drink plenty of fluids to prevent dehydration, enough so that your urine is light yellow or clear like water.  Choose water and other caffeine-free clear liquids until you feel better. If you have kidney, heart, or liver disease and have to limit fluids, talk with your doctor before you increase your fluid intake. · Drink fluids slowly, in frequent, small amounts, because drinking too much too fast can cause vomiting. · Begin eating mild foods, such as dry toast, yogurt, applesauce, bananas, and rice. Avoid spicy, hot, or high-fat foods, and do not drink alcohol or caffeine for a day or two. Do not drink milk or eat ice cream until you are feeling better. How to prevent gastroenteritis · Keep hot foods hot and cold foods cold. · Do not eat meats, dressings, salads, or other foods that have been kept at room temperature for more than 2 hours. · Use a thermometer to check your refrigerator. It should be between 34°F and 40°F. 
· Defrost meats in the refrigerator or microwave, not on the kitchen counter. · Keep your hands and your kitchen clean. Wash your hands, cutting boards, and countertops with hot soapy water frequently. · Cook meat until it is well done. · Do not eat raw eggs or uncooked sauces made with raw eggs. · Do not take chances. If food looks or tastes spoiled, throw it out. When should you call for help? Call 911 anytime you think you may need emergency care. For example, call if: 
? · You vomit blood or what looks like coffee grounds. ? · You passed out (lost consciousness). ? · You pass maroon or very bloody stools. ?Call your doctor now or seek immediate medical care if: 
? · You have severe belly pain. ? · You have signs of needing more fluids. You have sunken eyes, a dry mouth, and pass only a little dark urine. ? · You feel like you are going to faint. ? · You have increased belly pain that does not go away in 1 to 2 days. ? · You have new or increased nausea, or you are vomiting. ? · You have a new or higher fever. ? · Your stools are black and tarlike or have streaks of blood. ?Watch closely for changes in your health, and be sure to contact your doctor if: 
? · You are dizzy or lightheaded. ? · You urinate less than usual, or your urine is dark yellow or brown. ? · You do not feel better with each day that goes by. Where can you learn more? Go to http://patrice-florencio.info/. Enter N142 in the search box to learn more about \"Gastroenteritis: Care Instructions. \" Current as of: March 3, 2017 Content Version: 11.4 © 5113-3835 [employer]. Care instructions adapted under license by [employer] (which disclaims liability or warranty for this information). If you have questions about a medical condition or this instruction, always ask your healthcare professional. [address] any warranty or liability for your use of this information. Introducing [facility]! Dear Gianni Campbell: Thank you for requesting a [employer] account. Our records indicate that you already have an active [employer] account. You can access your account anytime at [email or web address] Did you know that you can access your hospital and ER discharge instructions at any time in Songtr[employer]? You can also review all of your test results from your hospital stay or ER visit. Additional Information If you have questions, please visit the Frequently Asked Questions section of the [employer] website at [email or web address] Remember, Song[employer] is NOT to be used for urgent needs. For medical emergencies, dial 911. Now available from your iPhone and Android! Please provide this summary of care documentation to your next provider. Your primary care clinician is listed as Meño Barnhart. If you have any questions after today's visit, please call [phone]. Orthopedic